# Patient Record
Sex: FEMALE | Race: WHITE | NOT HISPANIC OR LATINO | Employment: OTHER | URBAN - METROPOLITAN AREA
[De-identification: names, ages, dates, MRNs, and addresses within clinical notes are randomized per-mention and may not be internally consistent; named-entity substitution may affect disease eponyms.]

---

## 2017-05-19 RX ORDER — POTASSIUM CHLORIDE 1.5 G/1.58G
20 POWDER, FOR SOLUTION ORAL 2 TIMES DAILY
COMMUNITY
End: 2017-05-19

## 2017-05-19 RX ORDER — FUROSEMIDE 20 MG
40 TABLET ORAL EVERY MORNING
COMMUNITY

## 2017-05-19 RX ORDER — MULTIPLE VITAMINS W/ MINERALS TAB 9MG-400MCG
1 TAB ORAL EVERY MORNING
COMMUNITY

## 2017-05-19 RX ORDER — GEMFIBROZIL 600 MG/1
600 TABLET, FILM COATED ORAL
COMMUNITY

## 2017-05-19 RX ORDER — POLYETHYLENE GLYCOL 3350 17 G/17G
1 POWDER, FOR SOLUTION ORAL EVERY MORNING
COMMUNITY

## 2017-05-19 RX ORDER — FLUTICASONE PROPIONATE 50 MCG
1 SPRAY, SUSPENSION (ML) NASAL DAILY
COMMUNITY
End: 2020-03-04

## 2017-05-19 RX ORDER — OMEGA-3-ACID ETHYL ESTERS 1 G/1
CAPSULE, LIQUID FILLED ORAL
COMMUNITY
End: 2017-05-19

## 2017-05-19 RX ORDER — OMEGA-3 FATTY ACIDS/FISH OIL 300-1000MG
1 CAPSULE ORAL 2 TIMES DAILY
COMMUNITY

## 2017-05-19 RX ORDER — AZELASTINE 1 MG/ML
1 SPRAY, METERED NASAL DAILY PRN
COMMUNITY
End: 2021-06-11

## 2017-05-19 RX ORDER — OXYBUTYNIN CHLORIDE 5 MG/1
5 TABLET, EXTENDED RELEASE ORAL DAILY
COMMUNITY
End: 2017-05-19

## 2017-05-19 RX ORDER — SULFASALAZINE 500 MG/1
1000 TABLET ORAL 3 TIMES DAILY
COMMUNITY

## 2017-05-19 RX ORDER — LOVASTATIN 20 MG
40 TABLET ORAL AT BEDTIME
COMMUNITY

## 2017-05-22 ENCOUNTER — HOSPITAL ENCOUNTER (INPATIENT)
Facility: CLINIC | Age: 72
LOS: 1 days | Discharge: HOME | DRG: 470 | End: 2017-05-23
Attending: ORTHOPAEDIC SURGERY | Admitting: ORTHOPAEDIC SURGERY
Payer: MEDICARE

## 2017-05-22 ENCOUNTER — SURGERY (OUTPATIENT)
Age: 72
End: 2017-05-22

## 2017-05-22 ENCOUNTER — ANESTHESIA (OUTPATIENT)
Dept: SURGERY | Facility: CLINIC | Age: 72
DRG: 470 | End: 2017-05-22
Payer: MEDICARE

## 2017-05-22 ENCOUNTER — ANESTHESIA EVENT (OUTPATIENT)
Dept: SURGERY | Facility: CLINIC | Age: 72
DRG: 470 | End: 2017-05-22
Payer: MEDICARE

## 2017-05-22 ENCOUNTER — APPOINTMENT (OUTPATIENT)
Dept: GENERAL RADIOLOGY | Facility: CLINIC | Age: 72
DRG: 470 | End: 2017-05-22
Attending: ORTHOPAEDIC SURGERY
Payer: MEDICARE

## 2017-05-22 DIAGNOSIS — Z96.662 S/P ANKLE JOINT REPLACEMENT, LEFT: Primary | ICD-10-CM

## 2017-05-22 PROBLEM — Z96.669 S/P ANKLE JOINT REPLACEMENT: Status: ACTIVE | Noted: 2017-05-22

## 2017-05-22 LAB
CREAT SERPL-MCNC: 0.65 MG/DL (ref 0.52–1.04)
GFR SERPL CREATININE-BSD FRML MDRD: NORMAL ML/MIN/1.7M2
PLATELET # BLD AUTO: 217 10E9/L (ref 150–450)

## 2017-05-22 PROCEDURE — 0SPG04Z REMOVAL OF INTERNAL FIXATION DEVICE FROM LEFT ANKLE JOINT, OPEN APPROACH: ICD-10-PCS | Performed by: ORTHOPAEDIC SURGERY

## 2017-05-22 PROCEDURE — 36000063 ZZH SURGERY LEVEL 4 EA 15 ADDTL MIN: Performed by: ORTHOPAEDIC SURGERY

## 2017-05-22 PROCEDURE — 85049 AUTOMATED PLATELET COUNT: CPT | Performed by: PHYSICIAN ASSISTANT

## 2017-05-22 PROCEDURE — 25000566 ZZH SEVOFLURANE, EA 15 MIN: Performed by: ORTHOPAEDIC SURGERY

## 2017-05-22 PROCEDURE — 12000007 ZZH R&B INTERMEDIATE

## 2017-05-22 PROCEDURE — 82565 ASSAY OF CREATININE: CPT | Performed by: PHYSICIAN ASSISTANT

## 2017-05-22 PROCEDURE — 25000128 H RX IP 250 OP 636: Performed by: PHYSICIAN ASSISTANT

## 2017-05-22 PROCEDURE — 71000012 ZZH RECOVERY PHASE 1 LEVEL 1 FIRST HR: Performed by: ORTHOPAEDIC SURGERY

## 2017-05-22 PROCEDURE — 27110028 ZZH OR GENERAL SUPPLY NON-STERILE: Performed by: ORTHOPAEDIC SURGERY

## 2017-05-22 PROCEDURE — 36415 COLL VENOUS BLD VENIPUNCTURE: CPT | Performed by: PHYSICIAN ASSISTANT

## 2017-05-22 PROCEDURE — 71000013 ZZH RECOVERY PHASE 1 LEVEL 1 EA ADDTL HR: Performed by: ORTHOPAEDIC SURGERY

## 2017-05-22 PROCEDURE — 27210995 ZZH RX 272: Performed by: ORTHOPAEDIC SURGERY

## 2017-05-22 PROCEDURE — 25000128 H RX IP 250 OP 636: Performed by: ANESTHESIOLOGY

## 2017-05-22 PROCEDURE — 37000008 ZZH ANESTHESIA TECHNICAL FEE, 1ST 30 MIN: Performed by: ORTHOPAEDIC SURGERY

## 2017-05-22 PROCEDURE — 0SRG0JA REPLACEMENT OF LEFT ANKLE JOINT WITH SYNTHETIC SUBSTITUTE, UNCEMENTED, OPEN APPROACH: ICD-10-PCS | Performed by: ORTHOPAEDIC SURGERY

## 2017-05-22 PROCEDURE — 25000125 ZZHC RX 250: Performed by: NURSE ANESTHETIST, CERTIFIED REGISTERED

## 2017-05-22 PROCEDURE — 25000128 H RX IP 250 OP 636: Performed by: NURSE ANESTHETIST, CERTIFIED REGISTERED

## 2017-05-22 PROCEDURE — 27210794 ZZH OR GENERAL SUPPLY STERILE: Performed by: ORTHOPAEDIC SURGERY

## 2017-05-22 PROCEDURE — 0SPG08Z REMOVAL OF SPACER FROM LEFT ANKLE JOINT, OPEN APPROACH: ICD-10-PCS | Performed by: ORTHOPAEDIC SURGERY

## 2017-05-22 PROCEDURE — 25000125 ZZHC RX 250: Performed by: PHYSICIAN ASSISTANT

## 2017-05-22 PROCEDURE — 25000132 ZZH RX MED GY IP 250 OP 250 PS 637: Mod: GY | Performed by: PHYSICIAN ASSISTANT

## 2017-05-22 PROCEDURE — 0QBP0ZZ EXCISION OF LEFT METATARSAL, OPEN APPROACH: ICD-10-PCS | Performed by: ORTHOPAEDIC SURGERY

## 2017-05-22 PROCEDURE — A9270 NON-COVERED ITEM OR SERVICE: HCPCS | Mod: GY | Performed by: PHYSICIAN ASSISTANT

## 2017-05-22 PROCEDURE — 40000277 XR SURGERY CARM FLUORO LESS THAN 5 MIN W STILLS

## 2017-05-22 PROCEDURE — 40000170 ZZH STATISTIC PRE-PROCEDURE ASSESSMENT II: Performed by: ORTHOPAEDIC SURGERY

## 2017-05-22 PROCEDURE — 36000065 ZZH SURGERY LEVEL 4 W FLUORO 1ST 30 MIN: Performed by: ORTHOPAEDIC SURGERY

## 2017-05-22 PROCEDURE — 0L8P0ZZ DIVISION OF LEFT LOWER LEG TENDON, OPEN APPROACH: ICD-10-PCS | Performed by: ORTHOPAEDIC SURGERY

## 2017-05-22 PROCEDURE — S0077 INJECTION, CLINDAMYCIN PHOSP: HCPCS | Performed by: PHYSICIAN ASSISTANT

## 2017-05-22 PROCEDURE — C1776 JOINT DEVICE (IMPLANTABLE): HCPCS | Performed by: ORTHOPAEDIC SURGERY

## 2017-05-22 PROCEDURE — 37000009 ZZH ANESTHESIA TECHNICAL FEE, EACH ADDTL 15 MIN: Performed by: ORTHOPAEDIC SURGERY

## 2017-05-22 DEVICE — IMP INSERT TORNIER TIBIAL ANKLE SIZE 2X9MM LT LJU266: Type: IMPLANTABLE DEVICE | Site: ANKLE | Status: FUNCTIONAL

## 2017-05-22 DEVICE — IMP COMP TORNIER TALAR ANKLE SIZE 2 LT LJU212: Type: IMPLANTABLE DEVICE | Site: ANKLE | Status: FUNCTIONAL

## 2017-05-22 DEVICE — IMP INSERT TORNIER TIBIAL ANKLE BASE SIZE 2 LJU222: Type: IMPLANTABLE DEVICE | Site: ANKLE | Status: FUNCTIONAL

## 2017-05-22 RX ORDER — POLYETHYLENE GLYCOL 3350 17 G/17G
17 POWDER, FOR SOLUTION ORAL DAILY
Status: DISCONTINUED | OUTPATIENT
Start: 2017-05-22 | End: 2017-05-23 | Stop reason: HOSPADM

## 2017-05-22 RX ORDER — AZELASTINE 1 MG/ML
1 SPRAY, METERED NASAL DAILY PRN
Status: DISCONTINUED | OUTPATIENT
Start: 2017-05-22 | End: 2017-05-23 | Stop reason: HOSPADM

## 2017-05-22 RX ORDER — MULTIPLE VITAMINS W/ MINERALS TAB 9MG-400MCG
1 TAB ORAL EVERY MORNING
Status: DISCONTINUED | OUTPATIENT
Start: 2017-05-23 | End: 2017-05-23 | Stop reason: HOSPADM

## 2017-05-22 RX ORDER — FENTANYL CITRATE 50 UG/ML
25-50 INJECTION, SOLUTION INTRAMUSCULAR; INTRAVENOUS EVERY 5 MIN PRN
Status: DISCONTINUED | OUTPATIENT
Start: 2017-05-22 | End: 2017-05-22 | Stop reason: HOSPADM

## 2017-05-22 RX ORDER — ONDANSETRON 2 MG/ML
INJECTION INTRAMUSCULAR; INTRAVENOUS PRN
Status: DISCONTINUED | OUTPATIENT
Start: 2017-05-22 | End: 2017-05-22

## 2017-05-22 RX ORDER — CHLORAL HYDRATE 500 MG
1000 CAPSULE ORAL 2 TIMES DAILY
Status: DISCONTINUED | OUTPATIENT
Start: 2017-05-22 | End: 2017-05-23 | Stop reason: HOSPADM

## 2017-05-22 RX ORDER — SODIUM CHLORIDE, SODIUM LACTATE, POTASSIUM CHLORIDE, CALCIUM CHLORIDE 600; 310; 30; 20 MG/100ML; MG/100ML; MG/100ML; MG/100ML
INJECTION, SOLUTION INTRAVENOUS CONTINUOUS
Status: DISCONTINUED | OUTPATIENT
Start: 2017-05-22 | End: 2017-05-22

## 2017-05-22 RX ORDER — SODIUM CHLORIDE, SODIUM LACTATE, POTASSIUM CHLORIDE, CALCIUM CHLORIDE 600; 310; 30; 20 MG/100ML; MG/100ML; MG/100ML; MG/100ML
INJECTION, SOLUTION INTRAVENOUS CONTINUOUS
Status: DISCONTINUED | OUTPATIENT
Start: 2017-05-22 | End: 2017-05-23 | Stop reason: HOSPADM

## 2017-05-22 RX ORDER — OXYBUTYNIN CHLORIDE 5 MG/1
5 TABLET, EXTENDED RELEASE ORAL EVERY MORNING
Status: DISCONTINUED | OUTPATIENT
Start: 2017-05-23 | End: 2017-05-23 | Stop reason: HOSPADM

## 2017-05-22 RX ORDER — MAGNESIUM OXIDE 400 MG/1
400 TABLET ORAL 2 TIMES DAILY
Status: DISCONTINUED | OUTPATIENT
Start: 2017-05-22 | End: 2017-05-23 | Stop reason: HOSPADM

## 2017-05-22 RX ORDER — ONDANSETRON 2 MG/ML
4 INJECTION INTRAMUSCULAR; INTRAVENOUS EVERY 6 HOURS PRN
Status: DISCONTINUED | OUTPATIENT
Start: 2017-05-22 | End: 2017-05-23 | Stop reason: HOSPADM

## 2017-05-22 RX ORDER — FENTANYL CITRATE 50 UG/ML
INJECTION, SOLUTION INTRAMUSCULAR; INTRAVENOUS PRN
Status: DISCONTINUED | OUTPATIENT
Start: 2017-05-22 | End: 2017-05-22

## 2017-05-22 RX ORDER — CYCLOSPORINE 0.5 MG/ML
1 EMULSION OPHTHALMIC 2 TIMES DAILY
Status: DISCONTINUED | OUTPATIENT
Start: 2017-05-22 | End: 2017-05-23 | Stop reason: HOSPADM

## 2017-05-22 RX ORDER — NALOXONE HYDROCHLORIDE 0.4 MG/ML
.1-.4 INJECTION, SOLUTION INTRAMUSCULAR; INTRAVENOUS; SUBCUTANEOUS
Status: DISCONTINUED | OUTPATIENT
Start: 2017-05-22 | End: 2017-05-23 | Stop reason: HOSPADM

## 2017-05-22 RX ORDER — FOLIC ACID 1 MG/1
1 TABLET ORAL EVERY MORNING
Status: DISCONTINUED | OUTPATIENT
Start: 2017-05-23 | End: 2017-05-23 | Stop reason: HOSPADM

## 2017-05-22 RX ORDER — ACETAMINOPHEN 325 MG/1
650 TABLET ORAL EVERY 4 HOURS PRN
Status: DISCONTINUED | OUTPATIENT
Start: 2017-05-25 | End: 2017-05-23 | Stop reason: HOSPADM

## 2017-05-22 RX ORDER — ONDANSETRON 2 MG/ML
4 INJECTION INTRAMUSCULAR; INTRAVENOUS EVERY 30 MIN PRN
Status: DISCONTINUED | OUTPATIENT
Start: 2017-05-22 | End: 2017-05-22

## 2017-05-22 RX ORDER — CEFAZOLIN SODIUM 1 G/3ML
1 INJECTION, POWDER, FOR SOLUTION INTRAMUSCULAR; INTRAVENOUS SEE ADMIN INSTRUCTIONS
Status: DISCONTINUED | OUTPATIENT
Start: 2017-05-22 | End: 2017-05-22 | Stop reason: HOSPADM

## 2017-05-22 RX ORDER — ACETAMINOPHEN 325 MG/1
975 TABLET ORAL EVERY 8 HOURS
Status: DISCONTINUED | OUTPATIENT
Start: 2017-05-22 | End: 2017-05-23 | Stop reason: HOSPADM

## 2017-05-22 RX ORDER — ASPIRIN 81 MG/1
81 TABLET, CHEWABLE ORAL EVERY MORNING
Status: DISCONTINUED | OUTPATIENT
Start: 2017-05-22 | End: 2017-05-23 | Stop reason: HOSPADM

## 2017-05-22 RX ORDER — HYDROMORPHONE HYDROCHLORIDE 1 MG/ML
.3-.5 INJECTION, SOLUTION INTRAMUSCULAR; INTRAVENOUS; SUBCUTANEOUS EVERY 10 MIN PRN
Status: DISCONTINUED | OUTPATIENT
Start: 2017-05-22 | End: 2017-05-22

## 2017-05-22 RX ORDER — OXYCODONE HYDROCHLORIDE 5 MG/1
5-10 TABLET ORAL EVERY 4 HOURS PRN
Status: DISCONTINUED | OUTPATIENT
Start: 2017-05-22 | End: 2017-05-23 | Stop reason: HOSPADM

## 2017-05-22 RX ORDER — AMOXICILLIN 250 MG
1-2 CAPSULE ORAL 2 TIMES DAILY
Status: DISCONTINUED | OUTPATIENT
Start: 2017-05-22 | End: 2017-05-23 | Stop reason: HOSPADM

## 2017-05-22 RX ORDER — HYDROMORPHONE HYDROCHLORIDE 1 MG/ML
.3-.5 INJECTION, SOLUTION INTRAMUSCULAR; INTRAVENOUS; SUBCUTANEOUS
Status: DISCONTINUED | OUTPATIENT
Start: 2017-05-22 | End: 2017-05-23 | Stop reason: HOSPADM

## 2017-05-22 RX ORDER — MEPERIDINE HYDROCHLORIDE 25 MG/ML
12.5 INJECTION INTRAMUSCULAR; INTRAVENOUS; SUBCUTANEOUS
Status: DISCONTINUED | OUTPATIENT
Start: 2017-05-22 | End: 2017-05-22

## 2017-05-22 RX ORDER — PROPOFOL 10 MG/ML
INJECTION, EMULSION INTRAVENOUS CONTINUOUS PRN
Status: DISCONTINUED | OUTPATIENT
Start: 2017-05-22 | End: 2017-05-22

## 2017-05-22 RX ORDER — CEFAZOLIN SODIUM 2 G/100ML
2 INJECTION, SOLUTION INTRAVENOUS
Status: COMPLETED | OUTPATIENT
Start: 2017-05-22 | End: 2017-05-22

## 2017-05-22 RX ORDER — PHYSOSTIGMINE SALICYLATE 1 MG/ML
1.2 INJECTION INTRAVENOUS
Status: DISCONTINUED | OUTPATIENT
Start: 2017-05-22 | End: 2017-05-22

## 2017-05-22 RX ORDER — HYDROXYZINE HYDROCHLORIDE 10 MG/1
10 TABLET, FILM COATED ORAL EVERY 6 HOURS PRN
Status: DISCONTINUED | OUTPATIENT
Start: 2017-05-22 | End: 2017-05-23 | Stop reason: HOSPADM

## 2017-05-22 RX ORDER — PROPOFOL 10 MG/ML
INJECTION, EMULSION INTRAVENOUS PRN
Status: DISCONTINUED | OUTPATIENT
Start: 2017-05-22 | End: 2017-05-22

## 2017-05-22 RX ORDER — NALOXONE HYDROCHLORIDE 0.4 MG/ML
.1-.4 INJECTION, SOLUTION INTRAMUSCULAR; INTRAVENOUS; SUBCUTANEOUS
Status: DISCONTINUED | OUTPATIENT
Start: 2017-05-22 | End: 2017-05-22

## 2017-05-22 RX ORDER — ONDANSETRON 4 MG/1
4 TABLET, ORALLY DISINTEGRATING ORAL EVERY 6 HOURS PRN
Status: DISCONTINUED | OUTPATIENT
Start: 2017-05-22 | End: 2017-05-23 | Stop reason: HOSPADM

## 2017-05-22 RX ORDER — FENTANYL CITRATE 50 UG/ML
25-50 INJECTION, SOLUTION INTRAMUSCULAR; INTRAVENOUS
Status: DISCONTINUED | OUTPATIENT
Start: 2017-05-22 | End: 2017-05-22 | Stop reason: HOSPADM

## 2017-05-22 RX ORDER — FUROSEMIDE 20 MG
20 TABLET ORAL EVERY MORNING
Status: DISCONTINUED | OUTPATIENT
Start: 2017-05-23 | End: 2017-05-23 | Stop reason: HOSPADM

## 2017-05-22 RX ORDER — CYCLOSPORINE 0.5 MG/ML
1 EMULSION OPHTHALMIC 2 TIMES DAILY
COMMUNITY

## 2017-05-22 RX ORDER — ONDANSETRON 4 MG/1
4 TABLET, ORALLY DISINTEGRATING ORAL EVERY 30 MIN PRN
Status: DISCONTINUED | OUTPATIENT
Start: 2017-05-22 | End: 2017-05-22

## 2017-05-22 RX ORDER — EPHEDRINE SULFATE 50 MG/ML
INJECTION, SOLUTION INTRAMUSCULAR; INTRAVENOUS; SUBCUTANEOUS PRN
Status: DISCONTINUED | OUTPATIENT
Start: 2017-05-22 | End: 2017-05-22

## 2017-05-22 RX ORDER — CLINDAMYCIN PHOSPHATE 900 MG/50ML
900 INJECTION, SOLUTION INTRAVENOUS EVERY 8 HOURS
Status: COMPLETED | OUTPATIENT
Start: 2017-05-22 | End: 2017-05-23

## 2017-05-22 RX ORDER — FLUTICASONE PROPIONATE 50 MCG
1 SPRAY, SUSPENSION (ML) NASAL DAILY
Status: DISCONTINUED | OUTPATIENT
Start: 2017-05-23 | End: 2017-05-23 | Stop reason: HOSPADM

## 2017-05-22 RX ORDER — SODIUM CHLORIDE, SODIUM LACTATE, POTASSIUM CHLORIDE, CALCIUM CHLORIDE 600; 310; 30; 20 MG/100ML; MG/100ML; MG/100ML; MG/100ML
INJECTION, SOLUTION INTRAVENOUS CONTINUOUS PRN
Status: DISCONTINUED | OUTPATIENT
Start: 2017-05-22 | End: 2017-05-22

## 2017-05-22 RX ORDER — LIDOCAINE HYDROCHLORIDE 20 MG/ML
INJECTION, SOLUTION INFILTRATION; PERINEURAL PRN
Status: DISCONTINUED | OUTPATIENT
Start: 2017-05-22 | End: 2017-05-22

## 2017-05-22 RX ORDER — MAGNESIUM HYDROXIDE 1200 MG/15ML
LIQUID ORAL PRN
Status: DISCONTINUED | OUTPATIENT
Start: 2017-05-22 | End: 2017-05-22

## 2017-05-22 RX ORDER — LIDOCAINE 40 MG/G
CREAM TOPICAL
Status: DISCONTINUED | OUTPATIENT
Start: 2017-05-22 | End: 2017-05-23 | Stop reason: HOSPADM

## 2017-05-22 RX ORDER — PSEUDOEPHEDRINE HCL 60 MG
60 TABLET ORAL 2 TIMES DAILY PRN
Status: DISCONTINUED | OUTPATIENT
Start: 2017-05-22 | End: 2017-05-23 | Stop reason: HOSPADM

## 2017-05-22 RX ORDER — DEXAMETHASONE SODIUM PHOSPHATE 4 MG/ML
INJECTION, SOLUTION INTRA-ARTICULAR; INTRALESIONAL; INTRAMUSCULAR; INTRAVENOUS; SOFT TISSUE PRN
Status: DISCONTINUED | OUTPATIENT
Start: 2017-05-22 | End: 2017-05-22

## 2017-05-22 RX ORDER — ATENOLOL 50 MG/1
50 TABLET ORAL EVERY MORNING
Status: DISCONTINUED | OUTPATIENT
Start: 2017-05-23 | End: 2017-05-23 | Stop reason: HOSPADM

## 2017-05-22 RX ORDER — POTASSIUM CHLORIDE 1500 MG/1
20 TABLET, EXTENDED RELEASE ORAL 2 TIMES DAILY
Status: DISCONTINUED | OUTPATIENT
Start: 2017-05-22 | End: 2017-05-23 | Stop reason: HOSPADM

## 2017-05-22 RX ORDER — FOLIC ACID 1 MG/1
1 TABLET ORAL EVERY MORNING
COMMUNITY

## 2017-05-22 RX ORDER — GEMFIBROZIL 600 MG/1
600 TABLET, FILM COATED ORAL
Status: DISCONTINUED | OUTPATIENT
Start: 2017-05-22 | End: 2017-05-23 | Stop reason: HOSPADM

## 2017-05-22 RX ADMIN — ACETAMINOPHEN 975 MG: 325 TABLET, FILM COATED ORAL at 15:22

## 2017-05-22 RX ADMIN — GEMFIBROZIL 600 MG: 600 TABLET ORAL at 16:20

## 2017-05-22 RX ADMIN — POLYETHYLENE GLYCOL 3350 17 G: 17 POWDER, FOR SOLUTION ORAL at 18:23

## 2017-05-22 RX ADMIN — PROPOFOL 200 MG: 10 INJECTION, EMULSION INTRAVENOUS at 08:41

## 2017-05-22 RX ADMIN — LIDOCAINE HYDROCHLORIDE 60 MG: 20 INJECTION, SOLUTION INFILTRATION; PERINEURAL at 08:41

## 2017-05-22 RX ADMIN — SODIUM CHLORIDE, POTASSIUM CHLORIDE, SODIUM LACTATE AND CALCIUM CHLORIDE: 600; 310; 30; 20 INJECTION, SOLUTION INTRAVENOUS at 11:54

## 2017-05-22 RX ADMIN — ONDANSETRON 4 MG: 2 INJECTION INTRAMUSCULAR; INTRAVENOUS at 08:54

## 2017-05-22 RX ADMIN — SENNOSIDES AND DOCUSATE SODIUM 1 TABLET: 8.6; 5 TABLET ORAL at 20:39

## 2017-05-22 RX ADMIN — PHENYLEPHRINE HYDROCHLORIDE 100 MCG: 10 INJECTION, SOLUTION INTRAMUSCULAR; INTRAVENOUS; SUBCUTANEOUS at 09:24

## 2017-05-22 RX ADMIN — FENTANYL CITRATE 50 MCG: 50 INJECTION, SOLUTION INTRAMUSCULAR; INTRAVENOUS at 08:45

## 2017-05-22 RX ADMIN — PHENYLEPHRINE HYDROCHLORIDE 100 MCG: 10 INJECTION, SOLUTION INTRAMUSCULAR; INTRAVENOUS; SUBCUTANEOUS at 09:18

## 2017-05-22 RX ADMIN — FENTANYL CITRATE 25 MCG: 50 INJECTION, SOLUTION INTRAMUSCULAR; INTRAVENOUS at 09:17

## 2017-05-22 RX ADMIN — ACETAMINOPHEN 975 MG: 325 TABLET, FILM COATED ORAL at 21:26

## 2017-05-22 RX ADMIN — Medication 7.5 MG: at 09:36

## 2017-05-22 RX ADMIN — OYSTER SHELL CALCIUM WITH VITAMIN D 1 TABLET: 500; 200 TABLET, FILM COATED ORAL at 18:23

## 2017-05-22 RX ADMIN — PHENYLEPHRINE HYDROCHLORIDE 100 MCG: 10 INJECTION, SOLUTION INTRAMUSCULAR; INTRAVENOUS; SUBCUTANEOUS at 09:13

## 2017-05-22 RX ADMIN — ROPIVACAINE HYDROCHLORIDE 50 ML: 5 INJECTION, SOLUTION EPIDURAL; INFILTRATION; PERINEURAL at 08:15

## 2017-05-22 RX ADMIN — MIDAZOLAM HYDROCHLORIDE 2 MG: 1 INJECTION, SOLUTION INTRAMUSCULAR; INTRAVENOUS at 08:41

## 2017-05-22 RX ADMIN — SODIUM CHLORIDE, POTASSIUM CHLORIDE, SODIUM LACTATE AND CALCIUM CHLORIDE: 600; 310; 30; 20 INJECTION, SOLUTION INTRAVENOUS at 08:31

## 2017-05-22 RX ADMIN — Medication 1000 MG: at 20:37

## 2017-05-22 RX ADMIN — SODIUM CHLORIDE 300 ML: 0.9 IRRIGANT IRRIGATION at 09:34

## 2017-05-22 RX ADMIN — PHENYLEPHRINE HYDROCHLORIDE 50 MCG: 10 INJECTION, SOLUTION INTRAMUSCULAR; INTRAVENOUS; SUBCUTANEOUS at 09:39

## 2017-05-22 RX ADMIN — Medication 5 MG: at 09:46

## 2017-05-22 RX ADMIN — POTASSIUM CHLORIDE 20 MEQ: 1500 TABLET, EXTENDED RELEASE ORAL at 20:39

## 2017-05-22 RX ADMIN — DEXMEDETOMIDINE HYDROCHLORIDE 4 MCG: 100 INJECTION, SOLUTION INTRAVENOUS at 09:02

## 2017-05-22 RX ADMIN — SODIUM CHLORIDE, POTASSIUM CHLORIDE, SODIUM LACTATE AND CALCIUM CHLORIDE: 600; 310; 30; 20 INJECTION, SOLUTION INTRAVENOUS at 11:53

## 2017-05-22 RX ADMIN — ASPIRIN 81 MG 81 MG: 81 TABLET ORAL at 18:23

## 2017-05-22 RX ADMIN — CEFAZOLIN SODIUM 2 G: 2 INJECTION, SOLUTION INTRAVENOUS at 08:44

## 2017-05-22 RX ADMIN — FENTANYL CITRATE 50 MCG: 50 INJECTION, SOLUTION INTRAMUSCULAR; INTRAVENOUS at 08:41

## 2017-05-22 RX ADMIN — CYCLOSPORINE 1 DROP: 0.5 EMULSION OPHTHALMIC at 20:37

## 2017-05-22 RX ADMIN — MAGNESIUM OXIDE TAB 400 MG (241.3 MG ELEMENTAL MG) 400 MG: 400 (241.3 MG) TAB at 20:39

## 2017-05-22 RX ADMIN — PROPOFOL 200 MCG/KG/MIN: 10 INJECTION, EMULSION INTRAVENOUS at 08:41

## 2017-05-22 RX ADMIN — DEXAMETHASONE SODIUM PHOSPHATE 4 MG: 4 INJECTION, SOLUTION INTRA-ARTICULAR; INTRALESIONAL; INTRAMUSCULAR; INTRAVENOUS; SOFT TISSUE at 08:54

## 2017-05-22 RX ADMIN — DEXMEDETOMIDINE HYDROCHLORIDE 8 MCG: 100 INJECTION, SOLUTION INTRAVENOUS at 08:49

## 2017-05-22 RX ADMIN — Medication 5 MG: at 09:39

## 2017-05-22 RX ADMIN — CLINDAMYCIN PHOSPHATE 900 MG: 18 INJECTION, SOLUTION INTRAVENOUS at 16:20

## 2017-05-22 NOTE — ANESTHESIA POSTPROCEDURE EVALUATION
Patient: Jany Scherer    Procedure(s):  LEFT TOTAL ANKLE ARTHROPLASTY (FRANKLIN)^ WITH ACHILLES LENGTHENING AND HARDWARE REMOVAL ( C-ARM) - Wound Class: I-Clean   - Wound Class: I-Clean      Diagnosis:DJD LEFT ANKLE  Diagnosis Additional Information: No value filed.    Anesthesia Type:  General, Periph. Nerve Block for postop pain    Note:  Anesthesia Post Evaluation    Patient location during evaluation: PACU  Patient participation: Able to fully participate in evaluation  Level of consciousness: awake  Pain management: adequate  Airway patency: patent  Cardiovascular status: acceptable  Respiratory status: acceptable  Hydration status: acceptable  PONV: controlled     Anesthetic complications: None          Last vitals:  Vitals:    05/22/17 0820 05/22/17 1000 05/22/17 1015   BP: 138/81 142/71 130/70   Pulse: 84     Resp: 16 20 16   Temp:  36.3  C (97.4  F) 35.4  C (95.7  F)   SpO2: 97% 98% 100%         Electronically Signed By: Stu Mendoza MD  May 22, 2017  10:28 AM

## 2017-05-22 NOTE — IP AVS SNAPSHOT
[address] [facility]    [address] [place] [address]    Phone:  [phone]                                       After Visit Summary   5/22/2017    Jany Scehrer    MRN: [id number]           After Visit Summary Signature Page     I have received my discharge instructions, and my questions have been answered. I have discussed any challenges I see with this plan with the nurse or doctor.    ..........................................................................................................................................  Patient/Patient Representative Signature      ..........................................................................................................................................  Patient Representative Print Name and Relationship to Patient    ..................................................               ................................................  Date                                            Time    ..........................................................................................................................................  Reviewed by Signature/Title    ...................................................              ..............................................  Date                                                            Time

## 2017-05-22 NOTE — OR NURSING
Care assumed post nerve block placement.  See anesthesia record for procedure documentation. Pt awaiting surgery.

## 2017-05-22 NOTE — PROGRESS NOTES
Admission medication history interview status for the 5/22/2017  admission is complete. See EPIC admission navigator for prior to admission medications     Medication history source reliability:Good    Medication history interview source(s):Patient    Medication history resources (including written lists, pill bottles, clinic record):None    Primary pharmacy.[facility], [place]    Additional medication history information not noted on PTA med list :None    Time spent in this activity: 30 minutes    Prior to Admission medications    Medication Sig Last Dose Taking? Auth Provider   Cholecalciferol (VITAMIN D PO) Take 1 capsule by mouth every morning 5/22/2017 at 0300 Yes Reported, Patient   CRANBERRY PO Take 1 capsule by mouth At Bedtime 5/21/2017 at 2130 Yes Reported, Patient   CycloSPORINE (RESTASIS OP) Apply 1 drop to eye 2 times daily 5/22/2017 at 0300 Yes Reported, Patient   FOLIC ACID PO Take 1 mg by mouth every morning 5/22/2017 at 0300 Yes Reported, Patient   Acetaminophen (TYLENOL PO) Take 650 mg by mouth 2 times daily as needed for mild pain  5/21/2017 at 2130 Yes Reported, Patient   ASPIRIN PO Take 81 mg by mouth every morning  5/14/2017 at am Yes Reported, Patient   azelastine (ASTELIN) 0.1 % spray Spray 1 spray into both nostrils daily as needed  Over 1 week ago at prn Yes Reported, Patient   fluticasone (FLONASE) 50 MCG/ACT spray Spray 1 spray into both nostrils daily  5/22/2017 at 0300 Yes Reported, Patient   FUROSEMIDE PO Take 20 mg by mouth every morning  5/22/2017 at 0300 Yes Reported, Patient   GEMFIBROZIL PO Take 600 mg by mouth 2 times daily (before meals) 5/22/2017 at 0300 Yes Reported, Patient   LOVASTATIN PO Take 40 mg by mouth every morning (patient takes 2 X 20 mg = 40 mg dose) 5/22/2017 at 0300 Yes Reported, Patient   MAGNESIUM OXIDE PO Take 400 mg by mouth 2 times daily  5/22/2017 at 0300 Yes Reported, Patient   polyethylene glycol (MIRALAX/GLYCOLAX) powder Take 1 capful by mouth  daily 5/21/2017 at 0600 Yes Reported, Patient   multivitamin, therapeutic with minerals (MULTI-VITAMIN) TABS tablet Take 1 tablet by mouth every morning  5/22/2017 at 0300 Yes Reported, Patient   CALCIUM-VITAMIN D PO Take 1 tablet by mouth 2 times daily  5/22/2017 at 0300 Yes Reported, Patient   Pseudoephedrine HCl (SUDAFED PO) Take 60 mg by mouth 2 times daily as needed  Over 1 month ago at prn Yes Reported, Patient   SULFASALAZINE PO Take 2,000 mg by mouth 2 times daily (patient takes 4 X 500 = 2,000 mg dose) 5/22/2017 at 0300 Yes Reported, Patient   ATENOLOL PO Take 50 mg by mouth every morning  5/22/2017 at 0300 Yes Reported, Patient   Potassium Chloride Raissa CR (K-DUR PO) Take 20 mEq by mouth 2 times daily 5/22/2017 at 0300 Yes Reported, Patient   Omega-3 Fatty Acids (OMEGA 3 PO) Take 1 capsule by mouth 2 times daily  5/22/2017 at 0300 Yes Reported, Patient   OXYBUTYNIN CHLORIDE ER PO Take 5 mg by mouth every morning  5/22/2017 at 0300 Yes Reported, Patient

## 2017-05-22 NOTE — ANESTHESIA CARE TRANSFER NOTE
Patient: Jany Scherer    Procedure(s):  LEFT TOTAL ANKLE ARTHROPLASTY (FR[facility]LIN)^ WITH ACHILLES LENGTHENING AND HARDWARE REMOVAL ( C-ARM) - Wound Class: I-Clean   - Wound Class: I-Clean      Diagnosis: DJD LEFT ANKLE  Diagnosis Additional Information: No value filed.    Anesthesia Type:   General, Periph. Nerve Block for postop pain     Note:  Airway :Face Mask  Patient transferred to:PACU  Comments: VSS, RR and rhythm regular, TV's adequate, LMA removed to facemask 10LO2. Transferred to PACU, spontaneous respirations, 10L oxygen via facemask.  All monitors and alarms on and functioning, VSS.  Patient awake, comfortable.  Report to PACU RN.      Vitals: (Last set prior to Anesthesia Care Transfer)    CRNA VITALS  5/22/2017 0928 - 5/22/2017 1006      5/22/2017             Resp Rate (set): 10                Electronically Signed By: BULL Jones CRNA  May 22, 2017  10:06 AM

## 2017-05-22 NOTE — PLAN OF CARE
Problem: Goal Outcome Summary  Goal: Goal Outcome Summary  Outcome: No Change  Pt is DOS arrived around 1145, A&Ox3, IV infusing and VSS on 2L O2. Pt tolerating clear liquids so has been advanced to regular diet. Pt is DTV. Colostomy is intact and pt had PTA. Pt dressing is C/D/I and pulses +2, pt states the LLE is very numb still. Pt denies pain. Pt wears a CPAP at night. Pt had general anesthesia with popliteal and saphenous blocks and EBL of 50cc. Pt is progressing toward plan of care.

## 2017-05-22 NOTE — OP NOTE
DATE OF SERVICE:  05/22/2017.      PREOPERATIVE DIAGNOSES:   1.  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   2.  Retained hardware in the fibula and medial malleolus.   3.  Severe talonavicular degenerative changes.      POSTOPERATIVE DIAGNOSES:   1.  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   2.  Retained hardware in the fibula and medial malleolus.   3.  Severe talonavicular degenerative changes.      SURGICAL PROCEDURES:   1.  Takedown of a left ankle fusion with a conversion to a total ankle replacement using a Fitz Talaris size 2 tibia and talus and a 9 mm polyethylene spacer.   2.  Open cheilectomy and debridement of the left talonavicular joint.   3.  Removal of Arriola pamela, left fibula.   4.  A percutaneous left Achilles tendon lengthening.      ANESTHETIC:  General.      SURGEON:  Cuca Bee MD       ASSISTANT:  DANIAL Ferguson       PREAMBLE:  Ms. Jany Scherer had a bad ankle fracture many years ago.  Over time, she developed severe degenerative changes of her ankle and an autofusion of the joint.  She has significant talonavicular arthritis with significant pain.      The Arriola pamela in the fibula is palpable.      Informed consent was obtained for above-mentioned procedure.      Two grams of Ancef was given prior to surgery.  There is no need for postoperative antibiotic prophylaxis.      An assistant was present and was needed to help with this very complex procedure to help with retraction of the wounds and positioning of the cutting blocks.      DESCRIPTION OF PROCEDURE:  After adequate induction of a general anesthetic, the patient was positioned supine on the operating table.  The left leg was sterilely prepped and free draped in the usual fashion.  Tourniquet around the thigh was inflated to 300 mmHg.      A standard 15 cm midline incision was made anterior over the ankle.  The interval between extensor hallucis longus and  tibialis anterior was used to expose the joint.  There was a complete fusion of the tibiotalar joint.      Intraoperative fluoroscopy was used to determine the level of the ankle joint on the lateral view.  A block of bone was then removed using the tibial cutting block.  A complete medial and lateral gutter excision was also done to allow motion of the foot on the tibia.      The talar cutting blocks were then used to shape the talus for a primary ankle replacement.  These were extremely complicated cuts because of the previous fusion.      A trial reduction was then done using a size 2 talus and tibia with a 9 mm polyethylene spacer.  There was an equinus contracture.      A percutaneous Achilles tendon lengthening was done using a Mobile technique.  This increased dorsiflexion to 10 degrees.      The trial components were removed.  The ankle was thoroughly rinsed and the gutters were again debrided.  The definitive size 2 components were then inserted with a 9 mm polyethylene spacer with excellent stability and range of motion.      Osteotome was used to do a generous cheilectomy of the large osteophytes around the talonavicular joint.  There were significant degenerative changes of the joint, but it was not fused at this point.      This was followed by an incision over the fibula.  The Arriola pamela was exposed.  The extraction device was used to extract the Rush pamela.      The tourniquet was deflated.  Hemostasis obtained.  The wounds closed in layers.  A sterile dressing and a light compressive bandage applied, followed by a short-leg cast.      She tolerated the procedure well and was taken to the recovery room in satisfactory condition.      PLAN:  She can ambulate toe-touch weightbearing with crutches.  Sutures will be removed in 2 weeks.         ANITA MARROQUIN MD             D: 05/22/2017 09:42   T: 05/22/2017 10:28   MT: TS      Name:     SWETHA CARO   MRN:      [id number]        Account:         [id number]   :      1945           Procedure Date: 2017      Document: [id number]       cc: Cuca Bee MD

## 2017-05-22 NOTE — ANESTHESIA PROCEDURE NOTES
Peripheral nerve/Neuraxial procedure note : Popliteal  Pre-Procedure  Performed by SANDY GERMAN  Location:   Procedure Times:5/22/2017 8:20 AM    .   Procedure Documentation    .    Procedure:    Popliteal.     .  .       Assessment/Narrative  .  .  . Comments:  Sciatic-popliteal and saphenous nerve blocks for post-op analgesia  Betadine prep: 22guage 2inch stimulusNeedle: 40cc 0.5% Ropivicaine with 1/200/000 epinipherine for sciatic-popliteal block: 10cc same solution for Saphenous nerve block          0815  Left popliteal and saphenous blocks for pain relief at surgeon's request.  With the patient in the prone position, after IV started,  pulse oximeter in place: 22 GA Stimuplex and 25 GA; 50cc (40 + 10) 1/2 % Ropivicaine; 1;200,000 Epi

## 2017-05-22 NOTE — IP AVS SNAPSHOT
MRN:[id number]                      After Visit Summary   5/22/2017    Jany Scherer    MRN: [id number]           Thank you!     Thank you for choosing [facility] for your care. Our goal is always to provide you with excellent care. Hearing back from our patients is one way we can continue to improve our services. Please take a few minutes to complete the written survey that you may receive in the mail after you visit with us. Thank you!        Patient Information     Date Of Birth          1945        Designated Caregiver       Most Recent Value    Caregiver    Will someone help with your care after discharge? yes    Name of designated caregiver Iam-    Phone number of caregiver [phone]    Caregiver address [address] [address], [place], [place]      About your hospital stay     You were admitted on:  May 22, 2017 You last received care in the:  [place] [address] [facility]    You were discharged on:  May 23, 2017        Reason for your hospital stay       L TAA                  Who to Call     For medical emergencies, please call 911.  For non-urgent questions about your medical care, please call your primary care provider or clinic, [phone]  For questions related to your surgery, please call your surgery clinic        Attending Provider     Provider Specialty    Cuca Bee MD Internal Medicine       Primary Care Provider Office Phone # Fax #    Collette Castro [phone]       [place] [address] [address] [facility] [address]         When to contact your care team       Call Dr. Bee if you have any of the following: temperature greater than 102 or less than 96,  increased shortness of breath, increased drainage, increased swelling or increased pain.                  After Care Instructions     Activity       Your activity upon discharge: ambulate in house.  50% flatfoot weightbearing for tranfers, do not push off                   Follow-up Appointments     Follow-up and recommended labs and tests        Follow up with Dr. Bee , at (location with clinic name or city) [facility], within 2 weeks  to evaluate after surgery. No follow up labs or test are needed.                  Further instructions from your care team       Discharge Instructions Following Ankle Surgery    Activity Level:  1. Physical activity may be resumed gradually according to your comfort level.  2. Use you walker/crutches and limit the amount of weight you place on your foot, as instructed by your surgeon. Your surgeon will determine at your follow up visit when weight bearing restrictions will change or no longer apply.   3. It is recommended that you elevate your foot/ankle when lying or sitting to reduce swelling and pain.  4. It is also recommended that you apply ice to your foot/toes to help reduce swelling and discomfort.     Good Health Practices:  1. Maintain an adequate fluid intake and eat a well balanced diet to promote healing.  2. Surgery, decreased activity and pain medication all contribute to a decrease in bowel activity that can result in constipation. It is recommended that you increase your liquid intake, add fiber to your diet and add an over the counter laxative to your daily schedule if you experience these problems.  3. Keep your splint or cast clean and dry. WE recommend securing a plastic bag over your lower leg for showering to keep it dry.  4. Do not stick items between your cast and leg as this can cause skin or incision damage.    Follow Up:  1. If you have had a surgery performed by Dr. Bee, you should call to make a follow up appointment for 2 weeks from the date of surgery.  2. If your surgery was performed by one of our other doctors, please call to make your follow up appointment for 7-10 days from the date of surgery. Please call [phone] to schedule your appointment.       Total Ankle Arthroplasty and Ankle Fusion    J.  Jamshid Bee M.D.    This protocol provides you with general guidelines for initial stage and progression of rehabilitation according to specified time frames, related tissue tolerance and directional preference of movement. Specific changes in the program will be made by the physician as appropriate for the individual patient.     REMEMBER: It can take up to a year to make a full recovery, and it is not unusual to have intermittent pains and aches during that time!     Phase I: Date of Surgery - 6 weeks   Objective: Healing, protection of joint replacement and fusions   Immobilization: cast, splint; After 2 week follow-up visit: removable boot   WB Status: partial weight bearing       Phase II: Week 6-8   Objective: Healing, protection of joint replacement and fusions   Immobilization: use of removable walker boot as needed   WB Status: weight bearing   Therapy: may be initiated towards the end of this phase, 1-2 x per week with a focus on swelling reduction, pain control, and early return of AROM, home care/exercise instructions for motion, pain/swelling control       Phase III: Week 8-16   Objective: Swelling reduction, increase in ROM, neuromuscular re-education, develop baseline of ankle control/strength   Immobilization: None   WB Status: WBAT, progressive reduction in crutch use, *NOTE - WB status and gait progression determined by physician based on radiographic evidence of implant incorporation and consolidation of distal tibiofibular fusion   Therapy: 1-2 x per week based on patient s initial presentation, frequency may be reduced as the patient exhibits good recovery and progress towards goals, instructions in home care and exercise to complement clinical care.     Rehab Program:    ROM - AROM, PROM, patient directed stretching, joint mobilization, *NOTE - joint mobilization should focus on techniques for general talocrural distraction and facilitating dorsiflexion and plantarflexion. Techniques for  inversion and eversion should be minimized and may be contraindicated if the patient has had ancillary procedures such as subtalar fusion or triple arthrodesis. The distal tibiofibular syndesmosis should not be mobilized. Soft tissue techniques may be used for swelling reduction and scar tissue mobilization. Goals for ROM are ? 10  of dorsiflexion and ? 40  of plantarflexion      Strength - techniques should begin with isometrics in four directions with progression to resistive band/isotonic strengthening for dorsiflexion and plantarflexion. Due to joint fusions, eversion and inversion strengthening should continue isometrically, bands should progress to heavy resistance as tolerated, swimming and biking allowed as tolerated      Proprioception - may begin with seated BAPS board and progress to standing balance assisted exercises as tolerated       PHASE IV: Week 16-24   Objective: functional ROM, good strength, adequate proprioception for stable balance, normalize gait, tolerate full day of ADLs/work, return to reasonable recreational activities   WB status: full, patient should exhibit normalized gait   Therapy: 1x every 2-4 weeks based on patient status and progression, to be discharged to an independent exercise program once goals are achieved, patient to be instructed in appropriate home exercise program     Rehab Program:      ROM - patient to achieve ? 10  of dorsiflexion and ? 40  of plantarflexion         Strength - progression to body weight resistance exercises with goal of ability to perform a single leg heel raise         Proprioception - patient should be instructed in proprioceptive drills that provide both visual and surface challenges to balance         Agility - cone/stick drills, leg press plyometrics, soft landing drills         Sports - prior to return to any running or jumping activity the patient must display a normalized gait and have strength to perform repetitive single leg     Any further  "questions should be directed to your physician. Please call to schedule your follow-up appointment ([phone])    Follow -up appointment:__________________________________________________  Nurse Signature:_________________________________Date:________Time:______  Patient Signature:__________________________________Date:_____________      Pending Results     No orders found for last 3 day(s).            Statement of Approval     Ordered          17 1505  I have reviewed and agree with all the recommendations and orders detailed in this document.  EFFECTIVE NOW     Approved and electronically signed by:  Augusto Dickson, DANYA             Admission Information     Date & Time Provider Department Dept. Phone    2017 Cuca Bee MD [place] [address] [facility] [phone]      Your Vitals Were     Blood Pressure Pulse Temperature Respirations Height Weight    121/67 70 98.5  F (36.9  C) (Oral) 16 1.651 m (5' 5\") 63.1 kg (139 lb 1.6 oz)    Pulse Oximetry BMI (Body Mass Index)                97% 23.15 kg/m2          MyChart Information     [employer] lets you send messages to your doctor, view your test results, renew your prescriptions, schedule appointments and more. To sign up, go to [email or web address] . Click on \"Log in\" on the left side of the screen, which will take you to the Welcome page. Then click on \"Sign up Now\" on the right side of the page.     You will be asked to enter the access code listed below, as well as some personal information. Please follow the directions to create your username and password.     Your access code is: [email or web address]  Expires: 2017  2:44 PM     Your access code will  in 90 days. If you need help or a new code, please call your [facility] clinic or [phone].        Care EveryWhere ID     This is your Care EveryWhere ID. This could be used by other organizations to access your [facility] medical records  [id number]           Review " of your medicines      START taking        Dose / Directions    enoxaparin 40 MG/0.4ML injection   Commonly known as:  LOVENOX        Dose:  40 mg   Inject 0.4 mLs (40 mg) Subcutaneous every 24 hours   Quantity:  5 Syringe   Refills:  0       order for DME        Equipment being ordered: Walker Wheels () and Walker () Treatment Diagnosis: Difficulty walking   Quantity:  1 each   Refills:  0       oxyCODONE-acetaminophen 5-325 MG per tablet   Commonly known as:  PERCOCET        Dose:  1-2 tablet   Take 1-2 tablets by mouth every 4 hours as needed   Quantity:  60 tablet   Refills:  0       senna-docusate 8.6-50 MG per tablet   Commonly known as:  SENOKOT-S;PERICOLACE        Dose:  1-2 tablet   Take 1-2 tablets by mouth 2 times daily   Quantity:  40 tablet   Refills:  1         CONTINUE these medicines which have NOT CHANGED        Dose / Directions    ASPIRIN PO        Dose:  81 mg   Take 81 mg by mouth every morning   Refills:  0       ATENOLOL PO        Dose:  50 mg   Take 50 mg by mouth every morning   Refills:  0       azelastine 0.1 % spray   Commonly known as:  ASTELIN        Dose:  1 spray   Spray 1 spray into both nostrils daily as needed   Refills:  0       CALCIUM-VITAMIN D PO        Dose:  1 tablet   Take 1 tablet by mouth 2 times daily   Refills:  0       CRANBERRY PO        Dose:  1 capsule   Take 1 capsule by mouth At Bedtime   Refills:  0       fluticasone 50 MCG/ACT spray   Commonly known as:  FLONASE        Dose:  1 spray   Spray 1 spray into both nostrils daily   Refills:  0       FOLIC ACID PO        Dose:  1 mg   Take 1 mg by mouth every morning   Refills:  0       FUROSEMIDE PO        Dose:  20 mg   Take 20 mg by mouth every morning   Refills:  0       GEMFIBROZIL PO        Dose:  600 mg   Take 600 mg by mouth 2 times daily (before meals)   Refills:  0       K-DUR PO        Dose:  20 mEq   Take 20 mEq by mouth 2 times daily   Refills:  0       LOVASTATIN PO        Dose:  40 mg   Take 40  mg by mouth every morning (patient takes 2 X 20 mg = 40 mg dose)   Refills:  0       MAGNESIUM OXIDE PO        Dose:  400 mg   Take 400 mg by mouth 2 times daily   Refills:  0       Multi-vitamin Tabs tablet        Dose:  1 tablet   Take 1 tablet by mouth every morning   Refills:  0       OMEGA 3 PO        Dose:  1 capsule   Take 1 capsule by mouth 2 times daily   Refills:  0       OXYBUTYNIN CHLORIDE ER PO        Dose:  5 mg   Take 5 mg by mouth every morning   Refills:  0       polyethylene glycol powder   Commonly known as:  MIRALAX/GLYCOLAX        Dose:  1 capful   Take 1 capful by mouth daily   Refills:  0       RESTASIS OP        Dose:  1 drop   Apply 1 drop to eye 2 times daily   Refills:  0       SUDAFED PO        Dose:  60 mg   Take 60 mg by mouth 2 times daily as needed   Refills:  0       SULFASALAZINE PO        Dose:  2000 mg   Take 2,000 mg by mouth 2 times daily (patient takes 4 X 500 = 2,000 mg dose)   Refills:  0       TYLENOL PO        Dose:  650 mg   Take 650 mg by mouth 2 times daily as needed for mild pain   Refills:  0       VITAMIN D PO        Dose:  1 capsule   Take 1 capsule by mouth every morning   Refills:  0            Where to get your medicines      These medications were sent to [facility] - [address]  [address], [place] [address]     Phone:  [phone]     enoxaparin 40 MG/0.4ML injection    senna-docusate 8.6-50 MG per tablet         Some of these will need a paper prescription and others can be bought over the counter. Ask your nurse if you have questions.     Bring a paper prescription for each of these medications     order for DME    oxyCODONE-acetaminophen 5-325 MG per tablet                Protect others around you: Learn how to safely use, store and throw away your medicines at www.disposemymeds.org.             Medication List: This is a list of all your medications and when to take them. Check marks below indicate your  daily home schedule. Keep this list as a reference.      Medications           Morning Afternoon Evening Bedtime As Needed    ASPIRIN PO   Take 81 mg by mouth every morning   Last time this was given:  81 mg on 5/23/2017  9:35 AM   Next Dose Due:  05/24                                   ATENOLOL PO   Take 50 mg by mouth every morning   Last time this was given:  50 mg on 5/23/2017  9:36 AM   Next Dose Due:  05/24                                   azelastine 0.1 % spray   Commonly known as:  ASTELIN   Spray 1 spray into both nostrils daily as needed                                   CALCIUM-VITAMIN D PO   Take 1 tablet by mouth 2 times daily   Last time this was given:  1 tablet on 5/23/2017  9:36 AM   Next Dose Due:  05/23                                   CRANBERRY PO   Take 1 capsule by mouth At Bedtime   Next Dose Due:  05/23                                   enoxaparin 40 MG/0.4ML injection   Commonly known as:  LOVENOX   Inject 0.4 mLs (40 mg) Subcutaneous every 24 hours   Last time this was given:  40 mg on 5/23/2017  9:35 AM   Next Dose Due:  05/24                                   fluticasone 50 MCG/ACT spray   Commonly known as:  FLONASE   Spray 1 spray into both nostrils daily   Last time this was given:  1 spray on 5/23/2017  9:45 AM   Next Dose Due:  05/24                                   FOLIC ACID PO   Take 1 mg by mouth every morning   Last time this was given:  1 mg on 5/23/2017  9:36 AM   Next Dose Due:  05/24                                   FUROSEMIDE PO   Take 20 mg by mouth every morning   Last time this was given:  20 mg on 5/23/2017  9:36 AM   Next Dose Due:  05/24                                   GEMFIBROZIL PO   Take 600 mg by mouth 2 times daily (before meals)   Last time this was given:  600 mg on 5/23/2017  6:50 AM   Next Dose Due:  05/23                                   K-DUR PO   Take 20 mEq by mouth 2 times daily   Last time this was given:  20 mEq on 5/23/2017  9:35 AM   Next Dose  Due:  05/23                                   LOVASTATIN PO   Take 40 mg by mouth every morning (patient takes 2 X 20 mg = 40 mg dose)   Next Dose Due:  05/24                                   MAGNESIUM OXIDE PO   Take 400 mg by mouth 2 times daily   Last time this was given:  400 mg on 5/23/2017  9:35 AM   Next Dose Due:  05/23                                   Multi-vitamin Tabs tablet   Take 1 tablet by mouth every morning   Last time this was given:  1 tablet on 5/23/2017  9:36 AM   Next Dose Due:  05/24                                   OMEGA 3 PO   Take 1 capsule by mouth 2 times daily   Last time this was given:  1,000 mg on 5/23/2017  9:36 AM   Next Dose Due:  05/23                                   order for DME   Equipment being ordered: Walker Wheels () and Walker () Treatment Diagnosis: Difficulty walking                                OXYBUTYNIN CHLORIDE ER PO   Take 5 mg by mouth every morning   Last time this was given:  5 mg on 5/23/2017  9:36 AM   Next Dose Due:  05/24                                   oxyCODONE-acetaminophen 5-325 MG per tablet   Commonly known as:  PERCOCET   Take 1-2 tablets by mouth every 4 hours as needed   Next Dose Due:  5 pm. This evening.                                   polyethylene glycol powder   Commonly known as:  MIRALAX/GLYCOLAX   Take 1 capful by mouth daily   Next Dose Due:  05/24                                   RESTASIS OP   Apply 1 drop to eye 2 times daily   Last time this was given:  1 drop on 5/23/2017  9:45 AM   Next Dose Due:  05/23                                   senna-docusate 8.6-50 MG per tablet   Commonly known as:  SENOKOT-S;PERICOLACE   Take 1-2 tablets by mouth 2 times daily   Last time this was given:  2 tablets on 5/23/2017  9:36 AM   Next Dose Due:  05/23                                   SUDAFED PO   Take 60 mg by mouth 2 times daily as needed                                   SULFASALAZINE PO   Take 2,000 mg by mouth 2 times daily  (patient takes 4 X 500 = 2,000 mg dose)   Next Dose Due:  05/23                                   TYLENOL PO   Take 650 mg by mouth 2 times daily as needed for mild pain   Last time this was given:  975 mg on 5/23/2017  2:04 PM                                   VITAMIN D PO   Take 1 capsule by mouth every morning   Last time this was given:  2,000 Units on 5/23/2017  9:35 AM   Next Dose Due:  05/24

## 2017-05-22 NOTE — ANESTHESIA PREPROCEDURE EVALUATION
Anesthesia Evaluation     . Pt has had prior anesthetic. Type: General           ROS/MED HX    ENT/Pulmonary:     (+)sleep apnea, , . .    Neurologic:       Cardiovascular:     (+) hypertension----. : . . . :. .       METS/Exercise Tolerance:     Hematologic:     (+) Anemia, Other Hematologic Disorder-Thrombocytopenia      Musculoskeletal:   (+) arthritis, , , -       GI/Hepatic:     (+) Inflammatory bowel disease, hepatitis type Other, liver disease,       Renal/Genitourinary:         Endo:     (+) Obesity, .      Psychiatric:         Infectious Disease:         Malignancy:         Other:    (+) H/O Chronic Pain,                                  Anesthesia Plan      History & Physical Review  History and physical reviewed and following examination; no interval change.    ASA Status:  3 .        Plan for General and Periph. Nerve Block for postop pain with Intravenous induction. Maintenance will be TIVA.    PONV prophylaxis:  Ondansetron (or other 5HT-3) and Dexamethasone or Solumedrol  Propofol, Zofran, and decadron      Postoperative Care  Postoperative pain management:  IV analgesics and Oral pain medications.      Consents  Anesthetic plan, risks, benefits and alternatives discussed with:  Patient..                          .

## 2017-05-23 ENCOUNTER — APPOINTMENT (OUTPATIENT)
Dept: PHYSICAL THERAPY | Facility: CLINIC | Age: 72
DRG: 470 | End: 2017-05-23
Attending: ORTHOPAEDIC SURGERY
Payer: MEDICARE

## 2017-05-23 VITALS
HEIGHT: 65 IN | SYSTOLIC BLOOD PRESSURE: 121 MMHG | TEMPERATURE: 98.5 F | DIASTOLIC BLOOD PRESSURE: 67 MMHG | BODY MASS INDEX: 23.17 KG/M2 | WEIGHT: 139.1 LBS | OXYGEN SATURATION: 97 % | HEART RATE: 70 BPM | RESPIRATION RATE: 16 BRPM

## 2017-05-23 PROCEDURE — 25000128 H RX IP 250 OP 636: Performed by: PHYSICIAN ASSISTANT

## 2017-05-23 PROCEDURE — 40000193 ZZH STATISTIC PT WARD VISIT: Performed by: PHYSICAL THERAPIST

## 2017-05-23 PROCEDURE — 97161 PT EVAL LOW COMPLEX 20 MIN: CPT | Mod: GP | Performed by: PHYSICAL THERAPIST

## 2017-05-23 PROCEDURE — 97530 THERAPEUTIC ACTIVITIES: CPT | Mod: GP | Performed by: PHYSICAL THERAPIST

## 2017-05-23 PROCEDURE — 25000132 ZZH RX MED GY IP 250 OP 250 PS 637: Mod: GY | Performed by: PHYSICIAN ASSISTANT

## 2017-05-23 PROCEDURE — A9270 NON-COVERED ITEM OR SERVICE: HCPCS | Mod: GY | Performed by: PHYSICIAN ASSISTANT

## 2017-05-23 PROCEDURE — 25000125 ZZHC RX 250: Performed by: PHYSICIAN ASSISTANT

## 2017-05-23 PROCEDURE — S0077 INJECTION, CLINDAMYCIN PHOSP: HCPCS | Performed by: PHYSICIAN ASSISTANT

## 2017-05-23 PROCEDURE — 97116 GAIT TRAINING THERAPY: CPT | Mod: GP | Performed by: PHYSICAL THERAPIST

## 2017-05-23 RX ORDER — AMOXICILLIN 250 MG
1-2 CAPSULE ORAL 2 TIMES DAILY
Qty: 40 TABLET | Refills: 1 | Status: SHIPPED | OUTPATIENT
Start: 2017-05-23 | End: 2020-03-04

## 2017-05-23 RX ORDER — OXYCODONE AND ACETAMINOPHEN 5; 325 MG/1; MG/1
1-2 TABLET ORAL EVERY 4 HOURS PRN
Qty: 60 TABLET | Refills: 0 | Status: SHIPPED | OUTPATIENT
Start: 2017-05-23 | End: 2020-03-04

## 2017-05-23 RX ADMIN — Medication 1000 MG: at 09:36

## 2017-05-23 RX ADMIN — SENNOSIDES AND DOCUSATE SODIUM 2 TABLET: 8.6; 5 TABLET ORAL at 09:36

## 2017-05-23 RX ADMIN — OXYBUTYNIN CHLORIDE 5 MG: 5 TABLET, EXTENDED RELEASE ORAL at 09:36

## 2017-05-23 RX ADMIN — CYCLOSPORINE 1 DROP: 0.5 EMULSION OPHTHALMIC at 09:45

## 2017-05-23 RX ADMIN — ACETAMINOPHEN 975 MG: 325 TABLET, FILM COATED ORAL at 06:48

## 2017-05-23 RX ADMIN — OXYCODONE HYDROCHLORIDE 10 MG: 5 TABLET ORAL at 10:49

## 2017-05-23 RX ADMIN — FOLIC ACID 1 MG: 1 TABLET ORAL at 09:36

## 2017-05-23 RX ADMIN — HYDROMORPHONE HYDROCHLORIDE 0.5 MG: 1 INJECTION, SOLUTION INTRAMUSCULAR; INTRAVENOUS; SUBCUTANEOUS at 12:14

## 2017-05-23 RX ADMIN — FUROSEMIDE 20 MG: 20 TABLET ORAL at 09:36

## 2017-05-23 RX ADMIN — VITAMIN D, TAB 1000IU (100/BT) 2000 UNITS: 25 TAB at 09:35

## 2017-05-23 RX ADMIN — ATENOLOL 50 MG: 50 TABLET ORAL at 09:36

## 2017-05-23 RX ADMIN — POLYETHYLENE GLYCOL 3350 17 G: 17 POWDER, FOR SOLUTION ORAL at 09:37

## 2017-05-23 RX ADMIN — ASPIRIN 81 MG 81 MG: 81 TABLET ORAL at 09:35

## 2017-05-23 RX ADMIN — OXYCODONE HYDROCHLORIDE 10 MG: 5 TABLET ORAL at 06:50

## 2017-05-23 RX ADMIN — POTASSIUM CHLORIDE 20 MEQ: 1500 TABLET, EXTENDED RELEASE ORAL at 09:35

## 2017-05-23 RX ADMIN — MULTIPLE VITAMINS W/ MINERALS TAB 1 TABLET: TAB at 09:36

## 2017-05-23 RX ADMIN — CLINDAMYCIN PHOSPHATE 900 MG: 18 INJECTION, SOLUTION INTRAVENOUS at 04:11

## 2017-05-23 RX ADMIN — SODIUM CHLORIDE, POTASSIUM CHLORIDE, SODIUM LACTATE AND CALCIUM CHLORIDE: 600; 310; 30; 20 INJECTION, SOLUTION INTRAVENOUS at 04:11

## 2017-05-23 RX ADMIN — ENOXAPARIN SODIUM 40 MG: 40 INJECTION SUBCUTANEOUS at 09:35

## 2017-05-23 RX ADMIN — ONDANSETRON 4 MG: 4 TABLET, ORALLY DISINTEGRATING ORAL at 01:24

## 2017-05-23 RX ADMIN — MAGNESIUM OXIDE TAB 400 MG (241.3 MG ELEMENTAL MG) 400 MG: 400 (241.3 MG) TAB at 09:35

## 2017-05-23 RX ADMIN — OXYCODONE HYDROCHLORIDE 10 MG: 5 TABLET ORAL at 15:08

## 2017-05-23 RX ADMIN — ACETAMINOPHEN 975 MG: 325 TABLET, FILM COATED ORAL at 14:04

## 2017-05-23 RX ADMIN — OYSTER SHELL CALCIUM WITH VITAMIN D 1 TABLET: 500; 200 TABLET, FILM COATED ORAL at 09:36

## 2017-05-23 RX ADMIN — GEMFIBROZIL 600 MG: 600 TABLET ORAL at 06:50

## 2017-05-23 RX ADMIN — Medication 1 SPRAY: at 09:45

## 2017-05-23 RX ADMIN — OXYCODONE HYDROCHLORIDE 10 MG: 5 TABLET ORAL at 01:25

## 2017-05-23 NOTE — PLAN OF CARE
Problem: Goal Outcome Summary  Goal: Goal Outcome Summary  Outcome: No Change  Care Plan Summary Note: Pt A&Ox4 , VSS intact, afebrile. CMS intact. C/o  Mild LLE discomfort managed by tylenol. CPAP at HS. 50% WB.  Denies Nausea, vomiting, headache, SOB, dyspnea. Regular diet, IVF infusing.  Continue to monitor.

## 2017-05-23 NOTE — PROGRESS NOTES
Jany Scherer  2017  POD #1 L TAA    Doing well.  No immediate surgical complications identified.  No excessive bleeding  Pain well-controlled.  Temperatures:  Current - Temp: 99.2  F (37.3  C); Max - Temp  Av.1  F (36.2  C)  Min: 95.7  F (35.4  C)  Max: 99.2  F (37.3  C)  Pulse range: Pulse  Av.5  Min: 63  Max: 90  Blood pressure range: Systolic (24hrs), Av , Min:120 , Max:147   ; Diastolic (24hrs), Av, Min:60, Max:85    CMS: grossly intact to L LE  Labs:none    PLAN:DC home today/tomorrow.

## 2017-05-23 NOTE — PLAN OF CARE
Problem: Goal Outcome Summary  Goal: Goal Outcome Summary  OT: Chart reviewed and discussed performance with PT. Patient without a need for OT at this time.

## 2017-05-23 NOTE — PROGRESS NOTES
05/23/17 1107   Quick Adds   Type of Visit Initial PT Evaluation   Living Environment   Lives With spouse   Living Arrangements house   Home Accessibility stairs to enter home;stairs within home;bed and bath on same level;grab bars present (toilet)   Number of Stairs to Enter Home 2   Number of Stairs Within Home 12  (basement, need not access)   Transportation Available family or friend will provide   Self-Care   Usual Activity Tolerance good   Current Activity Tolerance moderate   Regular Exercise no   Equipment Currently Used at Home walker, standard   Activity/Exercise/Self-Care Comment pt has SW and 4 WW at home, will need FWW   Functional Level Prior   Ambulation 0-->independent   Transferring 0-->independent   Toileting 0-->independent   Bathing 0-->independent   Dressing 0-->independent   Fall history within last six months no   Which of the above functional risks had a recent onset or change? ambulation   General Information   Onset of Illness/Injury or Date of Surgery - Date 05/22/17   Referring Physician [place]   Patient/Family Goals Statement return home   Pertinent History of Current Problem (include personal factors and/or comorbidities that impact the POC) s/p L TAA   Precautions/Limitations fall precautions   Weight-Bearing Status - LLE partial weight-bearing (% in comments)  (50% foot flat no PO)   Cognitive Status Examination   Orientation orientation to person, place and time   Level of Consciousness alert   Follows Commands and Answers Questions 100% of the time   Personal Safety and Judgment intact   Memory intact   Range of Motion (ROM)   ROM Comment L ankle s/p TAA   Strength   Strength Comments 4/5 LE strength grossly   Bed Mobility   Bed Mobility Comments SBA   Transfer Skills   Transfer Comments CGA   Gait   Gait Comments CGA w/ WW, step to pattern L PWB   Balance   Balance Comments impaired in standing due to pain and wB restrictions   General Therapy Interventions   Planned Therapy  "Interventions bed mobility training;gait training;transfer training   Clinical Impression   Criteria for Skilled Therapeutic Intervention yes, treatment indicated   PT Diagnosis difficulty walking   Influenced by the following impairments pain, dec ROm and strength   Functional limitations due to impairments impaired functional mobility   Clinical Presentation Stable/Uncomplicated   Clinical Presentation Rationale clinical observation   Clinical Decision Making (Complexity) Low complexity   Therapy Frequency` 2 times/day   Predicted Duration of Therapy Intervention (days/wks) 2 days   Anticipated Equipment Needs at Discharge front wheeled walker   Anticipated Discharge Disposition Home with Assist   Risk & Benefits of therapy have been explained Yes   Patient, Family & other staff in agreement with plan of care Yes   [facility] TM \"6 Clicks\"   2016, Trustees of [facility], under license to [employer].  All rights reserved.   6 Clicks Short Forms Basic Mobility Inpatient Short Form   [facility]  \"6 Clicks\" V.2 Basic Mobility Inpatient Short Form   1. Turning from your back to your side while in a flat bed without using bedrails? 3 - A Little   2. Moving from lying on your back to sitting on the side of a flat bed without using bedrails? 3 - A Little   3. Moving to and from a bed to a chair (including a wheelchair)? 3 - A Little   4. Standing up from a chair using your arms (e.g., wheelchair, or bedside chair)? 3 - A Little   5. To walk in hospital room? 3 - A Little   6. Climbing 3-5 steps with a railing? 2 - A Lot   Basic Mobility Raw Score (Score out of 24.Lower scores equate to lower levels of function) 17   Total Evaluation Time   Total Evaluation Time (Minutes) 15     "

## 2017-05-23 NOTE — PLAN OF CARE
Problem: Goal Outcome Summary  Goal: Goal Outcome Summary     PT: Orders received and chart reviewed. Eval completed. Prior to surgery patient reporting indep w/ mobility and daily activities living with spouse in single level home w/ 3 steps to enter. Pt s/p L TAA, with order for 50% PWB foot flat with no push off. Pt has PUW and 4WW at home, agreed to issue FWW. Pt req SBA for bed mobility and CGA for transfers.  Pt amb 25 feet using WW, L PWB and req CGA. Pt inst in stair navigation to simulate home, completing 3 steps w/ min A using single HR,  present for instruction. Pt would benefit from additional session with PT this PM prior to DC to home.     Surgeon Discharge Plan: Home     Current Functional Status: see above     Barriers to Plan/Home: 2-3 steps to enter home with single grab bar

## 2017-05-23 NOTE — PLAN OF CARE
Problem: Goal Outcome Summary  Goal: Goal Outcome Summary     PT: Pt demonstrating indep w/ bed mobility and mod indep with transfers using WW, maintaining L PWB. Pt amb 50ft w/ WW and SBA. Pt negotiated 3 steps w/ single handrail and WW, L PWB - pt demonstrating ability to safely negotiate step to gain access to home. DC to home anticipated.      Surgeon Discharge Plan: Home      Current Functional Status: see above      Barriers to Plan/Home: 2-3 steps to enter home with single grab bar        Physical Therapy Discharge Summary    Reason for therapy discharge:    Discharged to home.    Progress towards therapy goal(s). See goals on Care Plan in [facility] electronic health record for goal details.  Goals met    Therapy recommendation(s):    No further therapy is recommended.

## 2017-05-23 NOTE — PLAN OF CARE
Problem: Goal Outcome Summary  Goal: Goal Outcome Summary  Outcome: No Change  A&Ox4. VSS, CPAP overnight, 2L NC O2 during the day. CMS intact. Pain managed with PRN oxycodone. Regular diet. Due to void, tried several times with bedpan with no success. Dressing moist bloody drainage marked, no change overnight. Pt has previous colostomy bag, CDI, pt assists with emptying herself. Pt  stayed overnight, they live approx [place] miles from [facility], plan to assess pain this afternoon and possible discharge late afternoon.

## 2017-05-23 NOTE — DISCHARGE INSTRUCTIONS
Discharge Instructions Following Ankle Surgery    Activity Level:  1. Physical activity may be resumed gradually according to your comfort level.  2. Use you walker/crutches and limit the amount of weight you place on your foot, as instructed by your surgeon. Your surgeon will determine at your follow up visit when weight bearing restrictions will change or no longer apply.   3. It is recommended that you elevate your foot/ankle when lying or sitting to reduce swelling and pain.  4. It is also recommended that you apply ice to your foot/toes to help reduce swelling and discomfort.     Good Health Practices:  1. Maintain an adequate fluid intake and eat a well balanced diet to promote healing.  2. Surgery, decreased activity and pain medication all contribute to a decrease in bowel activity that can result in constipation. It is recommended that you increase your liquid intake, add fiber to your diet and add an over the counter laxative to your daily schedule if you experience these problems.  3. Keep your splint or cast clean and dry. WE recommend securing a plastic bag over your lower leg for showering to keep it dry.  4. Do not stick items between your cast and leg as this can cause skin or incision damage.    Follow Up:  1. If you have had a surgery performed by Dr. Bee, you should call to make a follow up appointment for 2 weeks from the date of surgery.  2. If your surgery was performed by one of our other doctors, please call to make your follow up appointment for 7-10 days from the date of surgery. Please call [phone] to schedule your appointment.       Total Ankle Arthroplasty and Ankle Fusion    JOSEPH Bee M.D.    This protocol provides you with general guidelines for initial stage and progression of rehabilitation according to specified time frames, related tissue tolerance and directional preference of movement. Specific changes in the program will be made by the physician as appropriate  for the individual patient.     REMEMBER: It can take up to a year to make a full recovery, and it is not unusual to have intermittent pains and aches during that time!     Phase I: Date of Surgery - 6 weeks   Objective: Healing, protection of joint replacement and fusions   Immobilization: cast, splint; After 2 week follow-up visit: removable boot   WB Status: partial weight bearing       Phase II: Week 6-8   Objective: Healing, protection of joint replacement and fusions   Immobilization: use of removable walker boot as needed   WB Status: weight bearing   Therapy: may be initiated towards the end of this phase, 1-2 x per week with a focus on swelling reduction, pain control, and early return of AROM, home care/exercise instructions for motion, pain/swelling control       Phase III: Week 8-16   Objective: Swelling reduction, increase in ROM, neuromuscular re-education, develop baseline of ankle control/strength   Immobilization: None   WB Status: WBAT, progressive reduction in crutch use, *NOTE - WB status and gait progression determined by physician based on radiographic evidence of implant incorporation and consolidation of distal tibiofibular fusion   Therapy: 1-2 x per week based on patient s initial presentation, frequency may be reduced as the patient exhibits good recovery and progress towards goals, instructions in home care and exercise to complement clinical care.     Rehab Program:    ROM - AROM, PROM, patient directed stretching, joint mobilization, *NOTE - joint mobilization should focus on techniques for general talocrural distraction and facilitating dorsiflexion and plantarflexion. Techniques for inversion and eversion should be minimized and may be contraindicated if the patient has had ancillary procedures such as subtalar fusion or triple arthrodesis. The distal tibiofibular syndesmosis should not be mobilized. Soft tissue techniques may be used for swelling reduction and scar tissue  mobilization. Goals for ROM are ? 10  of dorsiflexion and ? 40  of plantarflexion      Strength - techniques should begin with isometrics in four directions with progression to resistive band/isotonic strengthening for dorsiflexion and plantarflexion. Due to joint fusions, eversion and inversion strengthening should continue isometrically, bands should progress to heavy resistance as tolerated, swimming and biking allowed as tolerated      Proprioception - may begin with seated BAPS board and progress to standing balance assisted exercises as tolerated       PHASE IV: Week 16-24   Objective: functional ROM, good strength, adequate proprioception for stable balance, normalize gait, tolerate full day of ADLs/work, return to reasonable recreational activities   WB status: full, patient should exhibit normalized gait   Therapy: 1x every 2-4 weeks based on patient status and progression, to be discharged to an independent exercise program once goals are achieved, patient to be instructed in appropriate home exercise program     Rehab Program:      ROM - patient to achieve ? 10  of dorsiflexion and ? 40  of plantarflexion         Strength - progression to body weight resistance exercises with goal of ability to perform a single leg heel raise         Proprioception - patient should be instructed in proprioceptive drills that provide both visual and surface challenges to balance         Agility - cone/stick drills, leg press plyometrics, soft landing drills         Sports - prior to return to any running or jumping activity the patient must display a normalized gait and have strength to perform repetitive single leg     Any further questions should be directed to your physician. Please call to schedule your follow-up appointment ([phone])    Follow -up appointment:__________________________________________________  Nurse Signature:_________________________________Date:________Time:______  Patient  Signature:__________________________________Date:_____________

## 2017-05-23 NOTE — PLAN OF CARE
Problem: Pain, Acute (Adult)  Goal: Identify Related Risk Factors and Signs and Symptoms  Related risk factors and signs and symptoms are identified upon initiation of Human Response Clinical Practice Guideline (CPG)      Discharge to home. Will follow up with PCP in 2 weeks as scheduled. Discharge education/medications complete. Transport arranged with .

## 2020-01-22 ENCOUNTER — TRANSFERRED RECORDS (OUTPATIENT)
Dept: HEALTH INFORMATION MANAGEMENT | Facility: CLINIC | Age: 75
End: 2020-01-22

## 2020-03-04 ENCOUNTER — OFFICE VISIT (OUTPATIENT)
Dept: SURGERY | Facility: CLINIC | Age: 75
End: 2020-03-04
Payer: MEDICARE

## 2020-03-04 VITALS
WEIGHT: 226 LBS | SYSTOLIC BLOOD PRESSURE: 134 MMHG | DIASTOLIC BLOOD PRESSURE: 70 MMHG | BODY MASS INDEX: 37.65 KG/M2 | HEIGHT: 65 IN | HEART RATE: 119 BPM

## 2020-03-04 DIAGNOSIS — K43.5 PARASTOMAL HERNIA WITHOUT OBSTRUCTION OR GANGRENE: Primary | ICD-10-CM

## 2020-03-04 DIAGNOSIS — K43.2 INCISIONAL HERNIA, WITHOUT OBSTRUCTION OR GANGRENE: ICD-10-CM

## 2020-03-04 PROCEDURE — 99204 OFFICE O/P NEW MOD 45 MIN: CPT | Performed by: SURGERY

## 2020-03-04 RX ORDER — VITAMIN B COMPLEX
100 TABLET ORAL EVERY MORNING
COMMUNITY

## 2020-03-04 RX ORDER — TURMERIC 400 MG
400 CAPSULE ORAL EVERY MORNING
COMMUNITY

## 2020-03-04 RX ORDER — COVID-19 ANTIGEN TEST
220 KIT MISCELLANEOUS PRN
COMMUNITY
End: 2023-01-24

## 2020-03-04 ASSESSMENT — MIFFLIN-ST. JEOR: SCORE: 1531.01

## 2020-03-05 DIAGNOSIS — K43.5 PARASTOMAL HERNIA WITHOUT OBSTRUCTION OR GANGRENE: Primary | ICD-10-CM
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
## 2020-06-08 ENCOUNTER — TRANSFERRED RECORDS (OUTPATIENT)
Dept: HEALTH INFORMATION MANAGEMENT | Facility: CLINIC | Age: 75
End: 2020-06-08

## 2020-07-20 ENCOUNTER — TRANSFERRED RECORDS (OUTPATIENT)
Dept: HEALTH INFORMATION MANAGEMENT | Facility: CLINIC | Age: 75
End: 2020-07-20

## 2020-11-08 ENCOUNTER — HEALTH MAINTENANCE LETTER (OUTPATIENT)
Age: 75
End: 2020-11-08

## 2021-05-24 DIAGNOSIS — Z11.59 ENCOUNTER FOR SCREENING FOR OTHER VIRAL DISEASES: ICD-10-CM

## 2021-06-11 ENCOUNTER — ANESTHESIA EVENT (OUTPATIENT)
Dept: SURGERY | Facility: CLINIC | Age: 76
End: 2021-06-11
Payer: MEDICARE

## 2021-06-11 RX ORDER — NYSTATIN 100000 [USP'U]/G
POWDER TOPICAL DAILY PRN
COMMUNITY

## 2021-06-11 RX ORDER — MAGNESIUM OXIDE 400 MG/1
800 TABLET ORAL EVERY EVENING
COMMUNITY

## 2021-06-11 RX ORDER — ASPIRIN 81 MG/1
81 TABLET ORAL EVERY MORNING
COMMUNITY

## 2021-06-11 RX ORDER — POTASSIUM CHLORIDE 1500 MG/1
20 TABLET, EXTENDED RELEASE ORAL 2 TIMES DAILY
COMMUNITY

## 2021-06-11 RX ORDER — METOPROLOL SUCCINATE 200 MG/1
200 TABLET, EXTENDED RELEASE ORAL EVERY MORNING
COMMUNITY

## 2021-06-11 RX ORDER — FLUTICASONE PROPIONATE 50 MCG
1 SPRAY, SUSPENSION (ML) NASAL 2 TIMES DAILY
COMMUNITY

## 2021-06-11 RX ORDER — SENNOSIDES 8.6 MG
1300 CAPSULE ORAL 2 TIMES DAILY PRN
COMMUNITY

## 2021-06-11 NOTE — PROGRESS NOTES
PTA medications updated by Medication Scribe prior to surgery via phone call with patient (last doses completed by Nurse)     Medication history sources: Patient, Surescripts and H&P  In the past week, patient estimated taking medication this percent of the time: Greater than 90%  Adherence assessment: N/A Not Observed    Significant changes made to the medication list:  None      Additional medication history information:   None    Medication reconciliation completed by provider prior to medication history? No    Time spent in this activity: 60 MINUTES    The information provided in this note is only as accurate as the sources available at the time of update(s)      Prior to Admission medications    Medication Sig Last Dose Taking? Auth Provider   acetaminophen (TYLENOL) 650 MG CR tablet Take 1,300 mg by mouth 2 times daily as needed for mild pain or fever (650MG X 2 = 1,300MG)  at PM Yes Reported, Patient   aspirin 81 MG EC tablet Take 81 mg by mouth every morning  at AM Yes Reported, Patient   calcium citrate and vitamin D (CITRACAL) 200-250 MG-UNIT TABS per tablet Take 1 tablet by mouth every morning  at AM Yes Reported, Patient   Cholecalciferol (VITAMIN D PO) Take 1 capsule by mouth every morning  at AM Yes Reported, Patient   Coenzyme Q10 (COQ10) 100 MG CAPS Take 100 mg by mouth every morning   at AM Yes Reported, Patient   cycloSPORINE (RESTASIS) 0.05 % ophthalmic emulsion Apply 1 drop to eye 2 times daily   at AM Yes Reported, Patient   fluticasone (FLONASE) 50 MCG/ACT nasal spray Spray 1 spray into both nostrils 2 times daily  at AM Yes Reported, Patient   folic acid (FOLVITE) 1 MG tablet Take 1 mg by mouth every morning   at AM Yes Reported, Patient   furosemide (LASIX) 20 MG tablet Take 40 mg by mouth every morning (200MG X 2 = 40MG)  at AM Yes Reported, Patient   gemfibrozil (LOPID) 600 MG tablet Take 600 mg by mouth 2 times daily (before meals)   at PM Yes Reported, Patient   lovastatin (MEVACOR) 20 MG  tablet Take 40 mg by mouth every morning (patient takes 2 X 20 mg = 40 mg dose)  at AM Yes Reported, Patient   magnesium oxide (MAG-OX) 400 MG tablet Take 800 mg by mouth every evening (400MG X 2 = 800MG)  at PM Yes Reported, Patient   metoprolol succinate ER (TOPROL-XL) 200 MG 24 hr tablet Take 200 mg by mouth every morning  at AM Yes Reported, Patient   multivitamin, therapeutic with minerals (MULTI-VITAMIN) TABS tablet Take 1 tablet by mouth every morning   at AM Yes Reported, Patient   naproxen sodium 220 MG capsule Take 220 mg by mouth as needed  at AM Yes Reported, Patient   nystatin (MYCOSTATIN) 153363 UNIT/GM external powder Apply topically every evening  at PM Yes Reported, Patient   omega 3 1000 MG CAPS Take 1 capsule by mouth 2 times daily   at AM Yes Reported, Patient   order for DME Equipment being ordered: Walker Wheels () and Walker ()  Treatment Diagnosis: Difficulty walking  Yes Cuca Bee MD   polyethylene glycol (MIRALAX/GLYCOLAX) powder Take 1 capful by mouth every morning   at AM Yes Reported, Patient   potassium chloride ER (K-TAB) 20 MEQ CR tablet Take 20 mEq by mouth 2 times daily  at PM Yes Reported, Patient   sulfaSALAzine (AZULFIDINE) 500 MG tablet Take 2,000 mg by mouth 2 times daily (patient takes 4 X 500 = 2,000 mg dose)  at PM Yes Reported, Patient   Turmeric 400 MG CAPS Take 400 mg by mouth every morning   at AM Yes Reported, Patient

## 2021-06-14 ENCOUNTER — HOSPITAL ENCOUNTER (OUTPATIENT)
Facility: CLINIC | Age: 76
Discharge: HOME | End: 2021-06-14
Attending: ORTHOPAEDIC SURGERY | Admitting: ORTHOPAEDIC SURGERY
Payer: MEDICARE

## 2021-06-14 ENCOUNTER — APPOINTMENT (OUTPATIENT)
Dept: GENERAL RADIOLOGY | Facility: CLINIC | Age: 76
End: 2021-06-14
Attending: ORTHOPAEDIC SURGERY
Payer: MEDICARE

## 2021-06-14 ENCOUNTER — ANESTHESIA (OUTPATIENT)
Dept: SURGERY | Facility: CLINIC | Age: 76
End: 2021-06-14
Payer: MEDICARE

## 2021-06-14 VITALS
WEIGHT: 236 LBS | TEMPERATURE: 97.4 F | SYSTOLIC BLOOD PRESSURE: 145 MMHG | HEART RATE: 81 BPM | BODY MASS INDEX: 39.32 KG/M2 | RESPIRATION RATE: 16 BRPM | HEIGHT: 65 IN | DIASTOLIC BLOOD PRESSURE: 79 MMHG | OXYGEN SATURATION: 98 %

## 2021-06-14 DIAGNOSIS — Z96.662 STATUS POST LEFT ANKLE JOINT REPLACEMENT: Primary | ICD-10-CM

## 2021-06-14 LAB — POTASSIUM SERPL-SCNC: 4 MMOL/L (ref 3.4–5.3)

## 2021-06-14 PROCEDURE — 370N000017 HC ANESTHESIA TECHNICAL FEE, PER MIN: Performed by: ORTHOPAEDIC SURGERY

## 2021-06-14 PROCEDURE — 999N000179 XR SURGERY CARM FLUORO LESS THAN 5 MIN W STILLS

## 2021-06-14 PROCEDURE — 250N000009 HC RX 250: Performed by: NURSE ANESTHETIST, CERTIFIED REGISTERED

## 2021-06-14 PROCEDURE — 250N000011 HC RX IP 250 OP 636: Performed by: ORTHOPAEDIC SURGERY

## 2021-06-14 PROCEDURE — 271N000001 HC OR GENERAL SUPPLY NON-STERILE: Performed by: ORTHOPAEDIC SURGERY

## 2021-06-14 PROCEDURE — 710N000009 HC RECOVERY PHASE 1, LEVEL 1, PER MIN: Performed by: ORTHOPAEDIC SURGERY

## 2021-06-14 PROCEDURE — 250N000009 HC RX 250: Performed by: ORTHOPAEDIC SURGERY

## 2021-06-14 PROCEDURE — C1713 ANCHOR/SCREW BN/BN,TIS/BN: HCPCS | Performed by: ORTHOPAEDIC SURGERY

## 2021-06-14 PROCEDURE — 250N000025 HC SEVOFLURANE, PER MIN: Performed by: ORTHOPAEDIC SURGERY

## 2021-06-14 PROCEDURE — 999N000141 HC STATISTIC PRE-PROCEDURE NURSING ASSESSMENT: Performed by: ORTHOPAEDIC SURGERY

## 2021-06-14 PROCEDURE — 710N000012 HC RECOVERY PHASE 2, PER MINUTE: Performed by: ORTHOPAEDIC SURGERY

## 2021-06-14 PROCEDURE — 250N000013 HC RX MED GY IP 250 OP 250 PS 637: Performed by: PHYSICIAN ASSISTANT

## 2021-06-14 PROCEDURE — 36415 COLL VENOUS BLD VENIPUNCTURE: CPT | Performed by: ANESTHESIOLOGY

## 2021-06-14 PROCEDURE — 272N000001 HC OR GENERAL SUPPLY STERILE: Performed by: ORTHOPAEDIC SURGERY

## 2021-06-14 PROCEDURE — 250N000011 HC RX IP 250 OP 636: Performed by: NURSE ANESTHETIST, CERTIFIED REGISTERED

## 2021-06-14 PROCEDURE — 258N000003 HC RX IP 258 OP 636: Performed by: NURSE ANESTHETIST, CERTIFIED REGISTERED

## 2021-06-14 PROCEDURE — 360N000083 HC SURGERY LEVEL 3 W/ FLUORO, PER MIN: Performed by: ORTHOPAEDIC SURGERY

## 2021-06-14 PROCEDURE — 250N000009 HC RX 250: Performed by: PHYSICIAN ASSISTANT

## 2021-06-14 PROCEDURE — 84132 ASSAY OF SERUM POTASSIUM: CPT | Performed by: ANESTHESIOLOGY

## 2021-06-14 RX ORDER — ONDANSETRON 4 MG/1
4 TABLET, ORALLY DISINTEGRATING ORAL
Status: DISCONTINUED | OUTPATIENT
Start: 2021-06-14 | End: 2021-06-14 | Stop reason: HOSPADM

## 2021-06-14 RX ORDER — PROPOFOL 10 MG/ML
INJECTION, EMULSION INTRAVENOUS PRN
Status: DISCONTINUED | OUTPATIENT
Start: 2021-06-14 | End: 2021-06-14

## 2021-06-14 RX ORDER — CLINDAMYCIN PHOSPHATE 900 MG/50ML
900 INJECTION, SOLUTION INTRAVENOUS
Status: COMPLETED | OUTPATIENT
Start: 2021-06-14 | End: 2021-06-14

## 2021-06-14 RX ORDER — HYDROMORPHONE HYDROCHLORIDE 1 MG/ML
.3-.5 INJECTION, SOLUTION INTRAMUSCULAR; INTRAVENOUS; SUBCUTANEOUS EVERY 5 MIN PRN
Status: DISCONTINUED | OUTPATIENT
Start: 2021-06-14 | End: 2021-06-14 | Stop reason: HOSPADM

## 2021-06-14 RX ORDER — BUPIVACAINE HYDROCHLORIDE 2.5 MG/ML
INJECTION, SOLUTION EPIDURAL; INFILTRATION; INTRACAUDAL PRN
Status: DISCONTINUED | OUTPATIENT
Start: 2021-06-14 | End: 2021-06-14 | Stop reason: HOSPADM

## 2021-06-14 RX ORDER — ONDANSETRON 4 MG/1
4-8 TABLET, ORALLY DISINTEGRATING ORAL EVERY 6 HOURS PRN
Qty: 15 TABLET | Refills: 0 | Status: SHIPPED | OUTPATIENT
Start: 2021-06-14 | End: 2022-12-01

## 2021-06-14 RX ORDER — NALOXONE HYDROCHLORIDE 0.4 MG/ML
0.2 INJECTION, SOLUTION INTRAMUSCULAR; INTRAVENOUS; SUBCUTANEOUS
Status: DISCONTINUED | OUTPATIENT
Start: 2021-06-14 | End: 2021-06-14 | Stop reason: HOSPADM

## 2021-06-14 RX ORDER — SODIUM CHLORIDE, SODIUM LACTATE, POTASSIUM CHLORIDE, CALCIUM CHLORIDE 600; 310; 30; 20 MG/100ML; MG/100ML; MG/100ML; MG/100ML
INJECTION, SOLUTION INTRAVENOUS CONTINUOUS
Status: DISCONTINUED | OUTPATIENT
Start: 2021-06-14 | End: 2021-06-14 | Stop reason: HOSPADM

## 2021-06-14 RX ORDER — ONDANSETRON 2 MG/ML
4 INJECTION INTRAMUSCULAR; INTRAVENOUS EVERY 30 MIN PRN
Status: DISCONTINUED | OUTPATIENT
Start: 2021-06-14 | End: 2021-06-14 | Stop reason: HOSPADM

## 2021-06-14 RX ORDER — LIDOCAINE 40 MG/G
CREAM TOPICAL
Status: DISCONTINUED | OUTPATIENT
Start: 2021-06-14 | End: 2021-06-14 | Stop reason: HOSPADM

## 2021-06-14 RX ORDER — ONDANSETRON 4 MG/1
4 TABLET, ORALLY DISINTEGRATING ORAL EVERY 30 MIN PRN
Status: DISCONTINUED | OUTPATIENT
Start: 2021-06-14 | End: 2021-06-14 | Stop reason: HOSPADM

## 2021-06-14 RX ORDER — IBUPROFEN 800 MG/1
800 TABLET, FILM COATED ORAL EVERY 8 HOURS PRN
Qty: 30 TABLET | Refills: 0 | Status: SHIPPED | OUTPATIENT
Start: 2021-06-14 | End: 2022-12-01

## 2021-06-14 RX ORDER — NALOXONE HYDROCHLORIDE 0.4 MG/ML
0.4 INJECTION, SOLUTION INTRAMUSCULAR; INTRAVENOUS; SUBCUTANEOUS
Status: DISCONTINUED | OUTPATIENT
Start: 2021-06-14 | End: 2021-06-14 | Stop reason: HOSPADM

## 2021-06-14 RX ORDER — AMOXICILLIN 250 MG
1-2 CAPSULE ORAL 2 TIMES DAILY
Qty: 30 TABLET | Refills: 0 | Status: SHIPPED | OUTPATIENT
Start: 2021-06-14 | End: 2022-12-01

## 2021-06-14 RX ORDER — FENTANYL CITRATE 50 UG/ML
INJECTION, SOLUTION INTRAMUSCULAR; INTRAVENOUS PRN
Status: DISCONTINUED | OUTPATIENT
Start: 2021-06-14 | End: 2021-06-14

## 2021-06-14 RX ORDER — HYDROCODONE BITARTRATE AND ACETAMINOPHEN 5; 325 MG/1; MG/1
1 TABLET ORAL
Status: COMPLETED | OUTPATIENT
Start: 2021-06-14 | End: 2021-06-14

## 2021-06-14 RX ORDER — SODIUM CHLORIDE, SODIUM LACTATE, POTASSIUM CHLORIDE, CALCIUM CHLORIDE 600; 310; 30; 20 MG/100ML; MG/100ML; MG/100ML; MG/100ML
INJECTION, SOLUTION INTRAVENOUS CONTINUOUS PRN
Status: DISCONTINUED | OUTPATIENT
Start: 2021-06-14 | End: 2021-06-14

## 2021-06-14 RX ORDER — FENTANYL CITRATE 50 UG/ML
25-50 INJECTION, SOLUTION INTRAMUSCULAR; INTRAVENOUS
Status: DISCONTINUED | OUTPATIENT
Start: 2021-06-14 | End: 2021-06-14 | Stop reason: HOSPADM

## 2021-06-14 RX ORDER — HYDROCODONE BITARTRATE AND ACETAMINOPHEN 5; 325 MG/1; MG/1
1-2 TABLET ORAL EVERY 4 HOURS PRN
Qty: 20 TABLET | Refills: 0 | Status: SHIPPED | OUTPATIENT
Start: 2021-06-14 | End: 2022-12-01

## 2021-06-14 RX ORDER — DEXAMETHASONE SODIUM PHOSPHATE 4 MG/ML
INJECTION, SOLUTION INTRA-ARTICULAR; INTRALESIONAL; INTRAMUSCULAR; INTRAVENOUS; SOFT TISSUE PRN
Status: DISCONTINUED | OUTPATIENT
Start: 2021-06-14 | End: 2021-06-14

## 2021-06-14 RX ORDER — ONDANSETRON 2 MG/ML
INJECTION INTRAMUSCULAR; INTRAVENOUS PRN
Status: DISCONTINUED | OUTPATIENT
Start: 2021-06-14 | End: 2021-06-14

## 2021-06-14 RX ORDER — LIDOCAINE HYDROCHLORIDE 20 MG/ML
INJECTION, SOLUTION INFILTRATION; PERINEURAL PRN
Status: DISCONTINUED | OUTPATIENT
Start: 2021-06-14 | End: 2021-06-14

## 2021-06-14 RX ORDER — CLINDAMYCIN PHOSPHATE 900 MG/50ML
900 INJECTION, SOLUTION INTRAVENOUS SEE ADMIN INSTRUCTIONS
Status: DISCONTINUED | OUTPATIENT
Start: 2021-06-14 | End: 2021-06-14 | Stop reason: HOSPADM

## 2021-06-14 RX ORDER — MAGNESIUM HYDROXIDE 1200 MG/15ML
LIQUID ORAL PRN
Status: DISCONTINUED | OUTPATIENT
Start: 2021-06-14 | End: 2021-06-14 | Stop reason: HOSPADM

## 2021-06-14 RX ADMIN — ONDANSETRON 4 MG: 2 INJECTION INTRAMUSCULAR; INTRAVENOUS at 09:19

## 2021-06-14 RX ADMIN — PHENYLEPHRINE HYDROCHLORIDE 100 MCG: 10 INJECTION INTRAVENOUS at 09:04

## 2021-06-14 RX ADMIN — HYDROCODONE BITARTRATE AND ACETAMINOPHEN 1 TABLET: 5; 325 TABLET ORAL at 10:32

## 2021-06-14 RX ADMIN — FENTANYL CITRATE 50 MCG: 50 INJECTION, SOLUTION INTRAMUSCULAR; INTRAVENOUS at 09:00

## 2021-06-14 RX ADMIN — MIDAZOLAM 1 MG: 1 INJECTION INTRAMUSCULAR; INTRAVENOUS at 08:56

## 2021-06-14 RX ADMIN — PROPOFOL 200 MG: 10 INJECTION, EMULSION INTRAVENOUS at 09:00

## 2021-06-14 RX ADMIN — PHENYLEPHRINE HYDROCHLORIDE 50 MCG: 10 INJECTION INTRAVENOUS at 09:05

## 2021-06-14 RX ADMIN — CLINDAMYCIN PHOSPHATE 900 MG: 900 INJECTION, SOLUTION INTRAVENOUS at 09:04

## 2021-06-14 RX ADMIN — FENTANYL CITRATE 50 MCG: 50 INJECTION, SOLUTION INTRAMUSCULAR; INTRAVENOUS at 09:11

## 2021-06-14 RX ADMIN — LIDOCAINE HYDROCHLORIDE 50 MG: 20 INJECTION, SOLUTION INFILTRATION; PERINEURAL at 09:00

## 2021-06-14 RX ADMIN — DEXAMETHASONE SODIUM PHOSPHATE 4 MG: 4 INJECTION, SOLUTION INTRA-ARTICULAR; INTRALESIONAL; INTRAMUSCULAR; INTRAVENOUS; SOFT TISSUE at 09:09

## 2021-06-14 RX ADMIN — SODIUM CHLORIDE, POTASSIUM CHLORIDE, SODIUM LACTATE AND CALCIUM CHLORIDE: 600; 310; 30; 20 INJECTION, SOLUTION INTRAVENOUS at 08:56

## 2021-06-14 ASSESSMENT — LIFESTYLE VARIABLES: TOBACCO_USE: 0

## 2021-06-14 ASSESSMENT — MIFFLIN-ST. JEOR: SCORE: 1571.37

## 2021-06-14 NOTE — ANESTHESIA POSTPROCEDURE EVALUATION
Patient: Jany Scherer    Procedure(s):  LEFT HALLUX RIGDUS REPAIR AND 2ND CLAWTOE REPAIR    Diagnosis:Senile arthritis [M19.90]  Diagnosis Additional Information: No value filed.    Anesthesia Type:  General    Note:     Postop Pain Control: Uneventful            Sign Out: Well controlled pain   PONV: No   Neuro/Psych: Uneventful            Sign Out: Acceptable/Baseline neuro status   Airway/Respiratory: Uneventful            Sign Out: Acceptable/Baseline resp. status   CV/Hemodynamics: Uneventful            Sign Out: Acceptable CV status; No obvious hypovolemia; No obvious fluid overload   Other NRE: NONE   DID A NON-ROUTINE EVENT OCCUR? No           Last vitals:  Vitals:    06/14/21 1015 06/14/21 1030 06/14/21 1120   BP: 136/76 112/68 (!) 145/79   Pulse: 73 63 81   Resp: 16 18 16   Temp:  36.3  C (97.3  F) 36.3  C (97.4  F)   SpO2: 99% 97% 98%       Last vitals prior to Anesthesia Care Transfer:  CRNA VITALS  6/14/2021 0906 - 6/14/2021 1006      6/14/2021             Resp Rate (observed):  12    EKG:  Sinus rhythm          Electronically Signed By: Amilcar Early MD  June 14, 2021  11:44 AM

## 2021-06-14 NOTE — ANESTHESIA CARE TRANSFER NOTE
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    Anesthesia Type:   General     Note:    Oropharynx: oropharynx clear of all foreign objects and spontaneously breathing  Level of Consciousness: awake  Oxygen Supplementation: face mask  Level of Supplemental Oxygen (L/min / FiO2): 6  Independent Airway: airway patency satisfactory and stable  Dentition: dentition unchanged  Vital Signs Stable: post-procedure vital signs reviewed and stable  Report to RN Given: handoff report given  Patient transferred to: PACU  Comments: Pt to PACU with O2 via mask, airway patent, VSS. Report to RN.  Handoff Report: Identifed the Patient, Identified the Reponsible Provider, Reviewed the pertinent medical history, Discussed the surgical course, Reviewed Intra-OP anesthesia mangement and issues during anesthesia, Set expectations for post-procedure period and Allowed opportunity for questions and acknowledgement of understanding      Vitals: (Last set prior to Anesthesia Care Transfer)  CRNA VITALS  6/14/2021 0906 - 6/14/2021 0941      6/14/2021             NIBP:  120/72    NIBP Mean:  104        Electronically Signed By: BULL Gabriel CRNA  June 14, 2021  9:41 AM

## 2021-06-14 NOTE — OR NURSING
"HPI:  Patient is a 43-year-old female who comes in today with complaints of sharp abdominal pain for the last 2 days associated with nausea.  She denies any vomiting.  She denies any fever, chills, sweats.  She has had no diarrhea.  She has been able to continue to eat throughout the illness.    Current meds have been verified and updated per the EMR  Exam:/80   Pulse 88   Temp 99.5 °F (37.5 °C) (Temporal)   Resp 20   Ht 5' 4" (1.626 m)   Wt (!) 166.7 kg (367 lb 8.1 oz)   BMI 63.08 kg/m²   Overall she is in no acute distress.  Abdomen soft, benign, no rebound or guarding.  She has some tenderness with deep palpation.  There is no organomegaly.  Negative Schmidt sign.        Impression:  Abdominal pain with nausea for the last 2 days.  Patient Active Problem List   Diagnosis    BMI 60.0-69.9, adult    Seasonal allergies    Low vitamin D level       Plan:  Orders Placed This Encounter    ondansetron (ZOFRAN) 4 MG tablet    traMADoL (ULTRAM) 50 mg tablet     She was given some tramadol and Zofran to use as needed for the next couple of days.  She has been encouraged to follow a clear liquid diet.  If her symptoms have not resolved in 3 days, she needs to let her primary care doctor know    This note is generated with speech recognition software and is subject to transcription error and sound alike phrases that may be missed by proofreading.      " VSS. A&O. Dressing intact, scant serosanguinous drainage to 2nd toe area. Denies pain. Sabiha PO. No N/V. DC instructions, meds and follow up reviewed with patient and . Questions answered. Able to teach back.

## 2021-06-14 NOTE — OP NOTE
Procedure Date: 06/14/2021    PREOPERATIVE DIAGNOSES:     1.  Left-sided hallux rigidus deformity.   2.  Fixed left second claw toe deformity.   3.  Flexible clawtoe deformity of the left third toe.    POSTOPERATIVE DIAGNOSES:  1.  Left-sided hallux rigidus deformity.   2.  Fixed left second claw toe deformity.   3.  Flexible clawtoe deformity of the left third toe.    PROCEDURES PERFORMED:   1.  Cheilectomy and debridement of the left great toe metatarsophalangeal joint.  2.  Left second clawtoe repair with proximal interphalangeal and distal interphalangeal joint excision arthroplasty and intramedullary screw fixation.  3.  Percutaneous flexor tenotomy of the third toe at the middle phalanx level.    ANESTHETIC:  General.    SURGEON:  Cuca Bee MD    ASSISTANT:  Jermaine Dickson     INDICATIONS FOR PROCEDURE:  Ms. Scherer presented with a left-sided hallux rigidus deformity.  She also has a fixed second and a flexible third claw toe deformity.    Informed consent was obtained for the above-mentioned procedures.    DESCRIPTION OF PROCEDURE:  After adequate induction of a general anesthetic, the patient was positioned supine on the operating table.  The left leg was sterilely prepped and free draped in the usual fashion.  Tourniquet around the thigh was inflated to 300 mmHg.    A longitudinal incision was made over the dorsum of the great toe MTP joint.  The extensor tendon was protected.  The joint was exposed.  There was a large dorsal and medial osteophyte over the metatarsal head.    A saw was used to do a generous cheilectomy dorsal and medial to remove the bone prominence and also increase dorsiflexion to about 90 degrees from preoperative to 45 degrees.  The remaining of the articular cartilage was normal.    This was followed by a longitudinal incision over the second toe.  PIP and DIP joint excision arthroplasty was done using a saw.  The toe was then reduced and immobilized with an intramedullary screw  with excellent alignment.  There was still medial deviation deformity at the MTP joint of the second toe.  A separate incision was made over the MTP joint and a complete medial capsular release done to help correct the alignment of the toe.    There was a flexible deformity of the third toe.  A percutaneous flexor tenotomy was done at the middle phalanx level to correct the deformity.    The tourniquet was deflated.  The wound was closed with nylon sutures.  A sterile dressing and a light compressive bandage applied.  She tolerated the procedure well and was taken to the recovery room in satisfactory condition.    She can ambulate weightbearing as tolerated.  Sutures will be removed in 2 weeks.    Cuca Bee MD        D: 2021   T: 2021   MT: [id number]    Name:     SWETHA CARO  MRN:      [id number]        Account:        [id number]   :      1945           Procedure Date: 2021     Document: [id number]

## 2021-06-14 NOTE — DISCHARGE INSTRUCTIONS
Same Day Surgery Discharge Instructions for  Sedation and General Anesthesia       It's not unusual to feel dizzy, light-headed or faint for up to 24 hours after surgery or while taking pain medication.  If you have these symptoms: sit for a few minutes before standing and have someone assist you when you get up to walk or use the bathroom.      You should rest and relax for the next 24 hours. We recommend you make arrangements to have an adult stay with you for at least 24 hours after your discharge.  Avoid hazardous and strenuous activity.      DO NOT DRIVE any vehicle or operate mechanical equipment for 24 hours following the end of your surgery.  Even though you may feel normal, your reactions may be affected by the medication you have received.      Do not drink alcoholic beverages for 24 hours following surgery.       Slowly progress to your regular diet as you feel able. It's not unusual to feel nauseated and/or vomit after receiving anesthesia.  If you develop these symptoms, drink clear liquids (apple juice, ginger ale, broth, 7-up, etc. ) until you feel better.  If your nausea and vomiting persists for 24 hours, please notify your surgeon.        All narcotic pain medications, along with inactivity and anesthesia, can cause constipation. Drinking plenty of liquids and increasing fiber intake will help.      For any questions of a medical nature, call your surgeon.      Do not make important decisions for 24 hours.      If you had general anesthesia, you may have a sore throat for a couple of days related to the breathing tube used during surgery.  You may use Cepacol lozenges to help with this discomfort.  If it worsens or if you develop a fever, contact your surgeon.       If you feel your pain is not well managed with the pain medications prescribed by your surgeon, please contact your surgeon's office to let them know so they can address your concerns.       CoVid 19 Information    We want to give you  information regarding Covid. Please consult your primary care provider with any questions you might have.     Patient who have symptoms (cough, fever, or shortness of breath), need to isolate for 7 days from when symptoms started OR 72 hours after fever resolves (without fever reducing medications) AND improvement of respiratory symptoms (whichever is longer).      Isolate yourself at home (in own room/own bathroom if possible)    Do Not allow any visitors    Do Not go to work or school    Do Not go to Mosque,  centers, shopping, or other public places.    Do Not shake hands.    Avoid close and intimate contact with others (hugging, kissing).    Follow CDC recommendations for household cleaning of frequently touched services.     After the initial 7 days, continue to isolate yourself from household members as much as possible. To continue decrease the risk of community spread and exposure, you and any members of your household should limit activities in public for 14 days after starting home isolation.     You can reference the following CDC link for helpful home isolation/care tips:  https://www.cdc.gov/coronavirus/2019-ncov/downloads/10Things.pdf    Protect Others:    Cover Your Mouth and Nose with a mask, disposable tissue or wash cloth to avoid spreading germs to others.    Wash your hands and face frequently with soap and water    Call Your Primary Doctor If: Breathing difficulty develops or you become worse.    For more information about COVID19 and options for caring for yourself at home, please visit the CDC website at https://www.cdc.gov/coronavirus/2019-ncov/about/steps-when-sick.html  For more options for care at [facility], please visit our website at [email or web address]          POST-OPERATIVE INSTRUCTIONS  FOOT AND ANKLE SURGERY  JOSEPH Bee M.D.  Jermaine Dickson P.A.-C    These precautions MUST be followed for the first 24 hours after surgery:    Upon  discharge, go directly home.    You must make arrangements to have a responsible adult stay with you.    DO NOT DRINK ALCOHOLIC BEVERAGES    It is not unusual to feel lightheaded up to 24 hours after surgery or while taking pain medication.  If you feel lightheaded, sit for a few minutes before standing and have someone assisted you when you get up to walk or use the restroom.    Do not use any mechanical equipment.    Do not make any important or legal decisions for 24 hours or while on pain medications.    You may experience dry mouth, sore throat, or sleep disturbances from the anesthesia and medications used during surgery.  Generalized muscle aches can sometimes occur.  These usually disappear in 12-24 hours.    POST-OPERATIVE CARE GUIDELINES    The following are general guidelines about what to expect the first days/weeks after surgery.  They are not specific, and your recovery may be slightly different.    Blood clots are not common, but are emergencies.  If you have sudden onset pain in your calf or leg, or have sudden shortness of breath, go to the Emergency Department.      Elevation of your operative foot/ankle is key to reducing the swelling in the immediate post-operative phase (first 3-5 days).  When you are at rest, elevate your foot at or above the level of your heart.  When sitting, your foot should be elevated on a chair or stool; not hanging down.      You should plan to rest the day after surgery no matter how minor the procedure.  More complicated procedures will require more time to return to normal activity.      Foot and ankle surgery is painful in most cases.   It is not unusual for the pain to be worse a day or two after surgery than on the day of.  If your pain is more than 8/10 contact our office.  If you don t have pain, gradually decrease your pain meds by substituting Tylenol.  Please don t use Advil/ibuprofen unless we order it for you.  Pt may resume regular home medications of:  ALL  PRESCRIBED BLOOD THINNERS (INCLUDING ASA 81MG) on the day after surgery.        You will be prescribed Percocet or Vicodin for pain, Vistaril for spasms/pain adjunct, Senokot for constipation.  If you have had trouble taking these meds before or experience nausea or vomiting after surgery from your medication, please advise the recovery room nurses.  If you are already at home, try drinking only clear liquids and/or call our office.  Start taking narcotic pain medication when you feel sensation in your lower extremity after a block.       All pain medications, along with inactivity can cause constipation.  Use the Senakot as directed, increase fluid intake to 1 quart per day and increase your dietary fiber.  (The  P  fruits - peaches, plums, pears, and prunes as well as anise/black licorice are recommended.)    It is not unusual to run a low-grade fever after surgery.  If your temperature is elevated above 102 , lasts longer than 24 hours, or is questionable in any way, contact our office.      Drainage from your cast/dressing is normal.  Reinforce your cast/dressing with 4x4 dressings and cover with an Ace wrap.  Wait until 24 hours after surgery to change your dressings; by this time most of your bleeding will have stopped.  If you have drainage through your dressings 2 days after surgery, contact our office.      You cast/dressing will be changed at your 2-week follow up appointment.  They should be kept clean and DRY.  If your cast/dressing is damaged before that, contact our office.      It is normal to experience some bruising in the toes after surgery and they may appear  dark  when your foot hangs down.  It is important to actively wiggle your toes for at least 5-10 minutes each hour UNLESS you had surgery on those toes.      Use ice on your foot/ankle over the dressing/cast for 20 minutes per hour, 10 or more times per day.  A large bag of frozen peas works well.      Bathing: take a tub or sponge bath  instead of a shower if possible during the first two weeks.  If you choose to shower, cover the dressing/cast with a waterproof covering, these may be ordered from www.seal-tight.com or are available at some pharmacies/medical supply stores.  Another option is XeroSox, which is the original vacuum sealed bandage and cast cover.  The cast cover has a vacuum seal and is absolutely waterproof.  5-071-026-2297 or www.xerosox.[employer] for more information.      Driving:  For surgery on the left foot/ankle, you may drive as soon as narcotic medications are stopped.  For surgery on the right foot/ankle, you may drive when you are out of a cast, off pain medications, and you feel secure with braking.      In general, listen to your foot/ankle.  If you have a sudden, dramatic increase in pain that does not resolve after an hour or so, something is wrong - call our office.  If you fall or bump your foot/ankle and have sudden pain that resolves, give it 24 hours before you call.      Many of your questions can be addressed at your 2-week follow-up appointment - please make a list of things to ask us as they come up during your recovery.      If you had a nerve block it is common to have numbness in your leg and foot for up to 30 hours after surgery.  If your leg or foot is still numb more than 30 hours after surgery, please call the office.      FUTURE DENTIST VISITS:  If you have had a total ankle arthroplasty please be advised you must be on antibiotics prior to ANY dental work.  Please call your dentist office ahead of time and make them aware of this as your dentist will be able to order the prescription.  If you have had any other surgery this is not a concern.    If you have any further questions or concerns, please call our office at [phone]      Pt may resume regular home medications of:  ALL PRESCRIBED BLOOD THINNERS (INCLUDING ASA 81MG)   on the day after surgery.
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
## 2021-09-11 ENCOUNTER — HEALTH MAINTENANCE LETTER (OUTPATIENT)
Age: 76
End: 2021-09-11

## 2022-01-01 ENCOUNTER — HEALTH MAINTENANCE LETTER (OUTPATIENT)
Age: 77
End: 2022-01-01

## 2022-02-26 ENCOUNTER — HEALTH MAINTENANCE LETTER (OUTPATIENT)
Age: 77
End: 2022-02-26

## 2022-10-13 ENCOUNTER — HOSPITAL ENCOUNTER (INPATIENT)
Facility: CLINIC | Age: 77
Setting detail: SURGERY ADMIT
End: 2022-10-13
Attending: ORTHOPAEDIC SURGERY | Admitting: ORTHOPAEDIC SURGERY
Payer: MEDICARE

## 2022-10-30 ENCOUNTER — HEALTH MAINTENANCE LETTER (OUTPATIENT)
Age: 77
End: 2022-10-30

## 2022-12-01 RX ORDER — DILTIAZEM HYDROCHLORIDE 120 MG/1
120 CAPSULE, EXTENDED RELEASE ORAL DAILY
COMMUNITY
Start: 2021-07-23 | End: 2022-12-13 | Stop reason: HOSPADM

## 2022-12-13 VITALS — WEIGHT: 231 LBS | BODY MASS INDEX: 39.44 KG/M2 | HEIGHT: 64 IN

## 2022-12-13 RX ORDER — CEFAZOLIN SODIUM/WATER 2 G/20 ML
2 SYRINGE (ML) INTRAVENOUS
Status: CANCELLED | OUTPATIENT
Start: 2022-12-13

## 2022-12-13 RX ORDER — GABAPENTIN 100 MG/1
100 CAPSULE ORAL
Status: CANCELLED | OUTPATIENT
Start: 2022-12-13

## 2022-12-13 RX ORDER — TRANEXAMIC ACID 10 MG/ML
5 INJECTION, SOLUTION INTRAVENOUS CONTINUOUS
Status: CANCELLED | OUTPATIENT
Start: 2022-12-13

## 2022-12-13 RX ORDER — CEFAZOLIN SODIUM/WATER 2 G/20 ML
2 SYRINGE (ML) INTRAVENOUS SEE ADMIN INSTRUCTIONS
Status: CANCELLED | OUTPATIENT
Start: 2022-12-13

## 2022-12-13 NOTE — PHARMACY-ADMISSION MEDICATION HISTORY
Medication history and patient interview completed by pre-admitting nurse (Laina Braun RN). Reviewed by pharmacist. No further clarifications needed.    Zoran Green RPh    Prior to Admission medications    Medication Sig Last Dose Taking? Auth Provider Long Term End Date   acetaminophen (TYLENOL) 650 MG CR tablet Take 1,300 mg by mouth 2 times daily as needed for mild pain or fever (650MG X 2 = 1,300MG)  Yes Reported, Patient     aspirin 81 MG EC tablet Take 81 mg by mouth every morning  Yes Reported, Patient     calcium citrate and vitamin D (CITRACAL) 200-250 MG-UNIT TABS per tablet Take 1 tablet by mouth every morning  Yes Reported, Patient     Coenzyme Q10 (COQ10) 100 MG CAPS Take 100 mg by mouth every morning   Yes Reported, Patient     cycloSPORINE (RESTASIS) 0.05 % ophthalmic emulsion Apply 1 drop to eye 2 times daily   Yes Reported, Patient     diltiazem ER (DILT-XR) 120 MG 24 hr capsule Take 120 mg by mouth daily  Yes Reported, Patient Yes    fluticasone (FLONASE) 50 MCG/ACT nasal spray Spray 1 spray into both nostrils 2 times daily  Yes Reported, Patient     folic acid (FOLVITE) 1 MG tablet Take 1 mg by mouth every morning   Yes Reported, Patient     furosemide (LASIX) 20 MG tablet Take 40 mg by mouth every morning  Yes Reported, Patient Yes    gemfibrozil (LOPID) 600 MG tablet Take 600 mg by mouth 2 times daily (before meals)   Yes Reported, Patient Yes    lovastatin (MEVACOR) 20 MG tablet Take 40 mg by mouth At Bedtime  Yes Reported, Patient Yes    magnesium oxide (MAG-OX) 400 MG tablet Take 800 mg by mouth every evening (400MG X 2 = 800MG)  Yes Reported, Patient     metoprolol succinate ER (TOPROL-XL) 200 MG 24 hr tablet Take 200 mg by mouth every morning  Yes Reported, Patient Yes    Multiple Vitamins-Minerals (PRESERVISION AREDS PO) Take by mouth daily  Yes Reported, Patient     multivitamin w/minerals (THERA-VIT-M) tablet Take 1 tablet by mouth every morning   Yes Reported, Patient      naproxen sodium 220 MG capsule Take 220 mg by mouth as needed  Yes Reported, Patient     nystatin (MYCOSTATIN) 761804 UNIT/GM external powder Apply topically daily as needed  Yes Reported, Patient     omega 3 1000 MG CAPS Take 1 capsule by mouth 2 times daily   Yes Reported, Patient     polyethylene glycol (MIRALAX/GLYCOLAX) powder Take 1 capful by mouth every morning   Yes Reported, Patient     potassium chloride ER (K-TAB) 20 MEQ CR tablet Take 20 mEq by mouth 2 times daily  Yes Reported, Patient     sulfaSALAzine (AZULFIDINE) 500 MG tablet Take 1,000 mg by mouth 3 times daily  Yes Reported, Patient     Turmeric 400 MG CAPS Take 400 mg by mouth every morning   Yes Reported, Patient     order for DME Equipment being ordered: Walker Wheels () and Walker ()  Treatment Diagnosis: Difficulty walking   Cuca Bee MD

## 2023-02-02 RX ORDER — DILTIAZEM HYDROCHLORIDE 120 MG/1
120 CAPSULE, EXTENDED RELEASE ORAL DAILY
COMMUNITY
Start: 2023-01-30

## 2023-02-02 NOTE — PHARMACY-ADMISSION MEDICATION HISTORY
Medication history and patient interview completed by pre-admitting nurse. Reviewed by pharmacist. No further clarifications needed.    [facility]    Nurse Complete Laina Braun RN Tue Jan 24, 2023  3:56 PM       Prior to Admission medications    Medication Sig Last Dose Taking? Auth Provider Long Term End Date   acetaminophen (TYLENOL) 650 MG CR tablet Take 1,300 mg by mouth 2 times daily as needed for mild pain or fever (650MG X 2 = 1,300MG)  Yes Reported, Patient     aspirin 81 MG EC tablet Take 81 mg by mouth every morning 1/24/2023 Yes Reported, Patient     calcium citrate and vitamin D (CITRACAL) 200-250 MG-UNIT TABS per tablet Take 1 tablet by mouth every morning  Yes Reported, Patient     Coenzyme Q10 (COQ10) 100 MG CAPS Take 100 mg by mouth every morning  1/20/2023 Yes Reported, Patient     cycloSPORINE (RESTASIS) 0.05 % ophthalmic emulsion Apply 1 drop to eye 2 times daily   Yes Reported, Patient     diltiazem ER (DILT-XR) 120 MG 24 hr capsule Take 120 mg by mouth daily  Yes Unknown, Entered By History Yes    fluticasone (FLONASE) 50 MCG/ACT nasal spray Spray 1 spray into both nostrils 2 times daily  Yes Reported, Patient     folic acid (FOLVITE) 1 MG tablet Take 1 mg by mouth every morning  1/20/2023 Yes Reported, Patient     furosemide (LASIX) 20 MG tablet Take 40 mg by mouth every morning  Yes Reported, Patient Yes    gemfibrozil (LOPID) 600 MG tablet Take 600 mg by mouth 2 times daily (before meals)   Yes Reported, Patient Yes    lovastatin (MEVACOR) 20 MG tablet Take 40 mg by mouth At Bedtime  Yes Reported, Patient Yes    magnesium oxide (MAG-OX) 400 MG tablet Take 800 mg by mouth every evening (400MG X 2 = 800MG) 1/24/2023 Yes Reported, Patient     metoprolol succinate ER (TOPROL-XL) 200 MG 24 hr tablet Take 200 mg by mouth every morning  Yes Reported, Patient Yes    multivitamin w/minerals (THERA-VIT-M) tablet Take 1 tablet by mouth every morning  1/20/2023 Yes Reported, Patient      nystatin (MYCOSTATIN) 438736 UNIT/GM external powder Apply topically daily as needed  Yes Reported, Patient     omega 3 1000 MG CAPS Take 1 capsule by mouth 2 times daily  1/20/2023 Yes Reported, Patient     polyethylene glycol (MIRALAX/GLYCOLAX) powder Take 1 capful by mouth every morning   Yes Reported, Patient     potassium chloride ER (K-TAB) 20 MEQ CR tablet Take 20 mEq by mouth 2 times daily  Yes Reported, Patient     sulfaSALAzine (AZULFIDINE) 500 MG tablet Take 1,000 mg by mouth 3 times daily  Yes Reported, Patient     Turmeric 400 MG CAPS Take 400 mg by mouth every morning  1/20/2023 Yes Reported, Patient     order for DME Equipment being ordered: Walker Wheels () and Walker ()  Treatment Diagnosis: Difficulty walking   Cuca Bee MD

## 2023-02-03 ENCOUNTER — ANESTHESIA (OUTPATIENT)
Dept: SURGERY | Facility: CLINIC | Age: 78
DRG: 455 | End: 2023-02-03
Payer: MEDICARE

## 2023-02-03 ENCOUNTER — HOSPITAL ENCOUNTER (INPATIENT)
Facility: CLINIC | Age: 78
LOS: 1 days | Discharge: HOME | DRG: 455 | End: 2023-02-04
Attending: ORTHOPAEDIC SURGERY | Admitting: ORTHOPAEDIC SURGERY
Payer: MEDICARE

## 2023-02-03 ENCOUNTER — APPOINTMENT (OUTPATIENT)
Dept: GENERAL RADIOLOGY | Facility: CLINIC | Age: 78
DRG: 455 | End: 2023-02-03
Attending: ORTHOPAEDIC SURGERY
Payer: MEDICARE

## 2023-02-03 ENCOUNTER — ANESTHESIA EVENT (OUTPATIENT)
Dept: SURGERY | Facility: CLINIC | Age: 78
DRG: 455 | End: 2023-02-03
Payer: MEDICARE

## 2023-02-03 DIAGNOSIS — M43.17 SPONDYLOLISTHESIS OF LUMBOSACRAL REGION: ICD-10-CM

## 2023-02-03 DIAGNOSIS — G89.18 POST-OP PAIN: Primary | ICD-10-CM

## 2023-02-03 PROBLEM — M43.10 SPONDYLOLISTHESIS: Status: ACTIVE | Noted: 2023-02-03

## 2023-02-03 LAB
ABO/RH(D): NORMAL
ANTIBODY SCREEN: NEGATIVE
HGB BLD-MCNC: 12.7 G/DL (ref 11.7–15.7)
SPECIMEN EXPIRATION DATE: NORMAL

## 2023-02-03 PROCEDURE — 86901 BLOOD TYPING SEROLOGIC RH(D): CPT | Performed by: ORTHOPAEDIC SURGERY

## 2023-02-03 PROCEDURE — 120N000001 HC R&B MED SURG/OB

## 2023-02-03 PROCEDURE — 4A11X4G MONITORING OF PERIPHERAL NERVOUS ELECTRICAL ACTIVITY, INTRAOPERATIVE, EXTERNAL APPROACH: ICD-10-PCS | Performed by: ORTHOPAEDIC SURGERY

## 2023-02-03 PROCEDURE — 0SG3071 FUSION OF LUMBOSACRAL JOINT WITH AUTOLOGOUS TISSUE SUBSTITUTE, POSTERIOR APPROACH, POSTERIOR COLUMN, OPEN APPROACH: ICD-10-PCS | Performed by: ORTHOPAEDIC SURGERY

## 2023-02-03 PROCEDURE — 250N000011 HC RX IP 250 OP 636: Performed by: ANESTHESIOLOGY

## 2023-02-03 PROCEDURE — 250N000009 HC RX 250: Performed by: ORTHOPAEDIC SURGERY

## 2023-02-03 PROCEDURE — 250N000025 HC SEVOFLURANE, PER MIN: Performed by: ORTHOPAEDIC SURGERY

## 2023-02-03 PROCEDURE — 370N000017 HC ANESTHESIA TECHNICAL FEE, PER MIN: Performed by: ORTHOPAEDIC SURGERY

## 2023-02-03 PROCEDURE — 258N000003 HC RX IP 258 OP 636: Performed by: NURSE ANESTHETIST, CERTIFIED REGISTERED

## 2023-02-03 PROCEDURE — 36415 COLL VENOUS BLD VENIPUNCTURE: CPT | Performed by: ORTHOPAEDIC SURGERY

## 2023-02-03 PROCEDURE — 272N000002 HC OR SUPPLY OTHER OPNP: Performed by: ORTHOPAEDIC SURGERY

## 2023-02-03 PROCEDURE — 250N000011 HC RX IP 250 OP 636: Performed by: NURSE ANESTHETIST, CERTIFIED REGISTERED

## 2023-02-03 PROCEDURE — 999N000141 HC STATISTIC PRE-PROCEDURE NURSING ASSESSMENT: Performed by: ORTHOPAEDIC SURGERY

## 2023-02-03 PROCEDURE — 999N000179 XR SURGERY CARM FLUORO LESS THAN 5 MIN W STILLS: Mod: TC

## 2023-02-03 PROCEDURE — 86850 RBC ANTIBODY SCREEN: CPT | Performed by: ORTHOPAEDIC SURGERY

## 2023-02-03 PROCEDURE — C1713 ANCHOR/SCREW BN/BN,TIS/BN: HCPCS | Performed by: ORTHOPAEDIC SURGERY

## 2023-02-03 PROCEDURE — 258N000003 HC RX IP 258 OP 636: Performed by: ANESTHESIOLOGY

## 2023-02-03 PROCEDURE — 250N000009 HC RX 250: Performed by: NURSE ANESTHETIST, CERTIFIED REGISTERED

## 2023-02-03 PROCEDURE — 258N000003 HC RX IP 258 OP 636: Performed by: ORTHOPAEDIC SURGERY

## 2023-02-03 PROCEDURE — 01NB0ZZ RELEASE LUMBAR NERVE, OPEN APPROACH: ICD-10-PCS | Performed by: ORTHOPAEDIC SURGERY

## 2023-02-03 PROCEDURE — 710N000009 HC RECOVERY PHASE 1, LEVEL 1, PER MIN: Performed by: ORTHOPAEDIC SURGERY

## 2023-02-03 PROCEDURE — 0SG30A0 FUSION OF LUMBOSACRAL JOINT WITH INTERBODY FUSION DEVICE, ANTERIOR APPROACH, ANTERIOR COLUMN, OPEN APPROACH: ICD-10-PCS | Performed by: ORTHOPAEDIC SURGERY

## 2023-02-03 PROCEDURE — 250N000011 HC RX IP 250 OP 636: Performed by: ORTHOPAEDIC SURGERY

## 2023-02-03 PROCEDURE — 0SB40ZZ EXCISION OF LUMBOSACRAL DISC, OPEN APPROACH: ICD-10-PCS | Performed by: ORTHOPAEDIC SURGERY

## 2023-02-03 PROCEDURE — 07DR3ZZ EXTRACTION OF ILIAC BONE MARROW, PERCUTANEOUS APPROACH: ICD-10-PCS | Performed by: ORTHOPAEDIC SURGERY

## 2023-02-03 PROCEDURE — 272N000001 HC OR GENERAL SUPPLY STERILE: Performed by: ORTHOPAEDIC SURGERY

## 2023-02-03 PROCEDURE — 85018 HEMOGLOBIN: CPT | Performed by: ORTHOPAEDIC SURGERY

## 2023-02-03 PROCEDURE — 250N000013 HC RX MED GY IP 250 OP 250 PS 637: Performed by: ORTHOPAEDIC SURGERY

## 2023-02-03 PROCEDURE — 272N000282 HC OR IOM SUPPLIES OPNP: Performed by: ORTHOPAEDIC SURGERY

## 2023-02-03 PROCEDURE — 360N000084 HC SURGERY LEVEL 4 W/ FLUORO, PER MIN: Performed by: ORTHOPAEDIC SURGERY

## 2023-02-03 DEVICE — IMPLANTABLE DEVICE: Type: IMPLANTABLE DEVICE | Site: SPINE LUMBAR | Status: FUNCTIONAL

## 2023-02-03 DEVICE — SCREW SET SPNE STAR OPN LNK PATHLOC-L STRL LF: Type: IMPLANTABLE DEVICE | Site: SPINE LUMBAR | Status: FUNCTIONAL

## 2023-02-03 DEVICE — SCREW BN 50MM 7.5MM ST CLS 2 THRD 127MM NS PATHLOC-L SPNE LF: Type: IMPLANTABLE DEVICE | Site: SPINE LUMBAR | Status: FUNCTIONAL

## 2023-02-03 RX ORDER — AMOXICILLIN 250 MG
1 CAPSULE ORAL 2 TIMES DAILY
Status: DISCONTINUED | OUTPATIENT
Start: 2023-02-03 | End: 2023-02-04 | Stop reason: HOSPADM

## 2023-02-03 RX ORDER — NALOXONE HYDROCHLORIDE 0.4 MG/ML
0.4 INJECTION, SOLUTION INTRAMUSCULAR; INTRAVENOUS; SUBCUTANEOUS
Status: DISCONTINUED | OUTPATIENT
Start: 2023-02-03 | End: 2023-02-04 | Stop reason: HOSPADM

## 2023-02-03 RX ORDER — LABETALOL HYDROCHLORIDE 5 MG/ML
10 INJECTION, SOLUTION INTRAVENOUS
Status: DISCONTINUED | OUTPATIENT
Start: 2023-02-03 | End: 2023-02-03 | Stop reason: HOSPADM

## 2023-02-03 RX ORDER — LIDOCAINE HYDROCHLORIDE 10 MG/ML
INJECTION, SOLUTION INFILTRATION; PERINEURAL PRN
Status: DISCONTINUED | OUTPATIENT
Start: 2023-02-03 | End: 2023-02-03

## 2023-02-03 RX ORDER — GABAPENTIN 100 MG/1
100 CAPSULE ORAL AT BEDTIME
Status: DISCONTINUED | OUTPATIENT
Start: 2023-02-03 | End: 2023-02-04 | Stop reason: HOSPADM

## 2023-02-03 RX ORDER — OXYCODONE HYDROCHLORIDE 10 MG/1
10 TABLET ORAL EVERY 4 HOURS PRN
Status: DISCONTINUED | OUTPATIENT
Start: 2023-02-03 | End: 2023-02-04 | Stop reason: HOSPADM

## 2023-02-03 RX ORDER — HYDROMORPHONE HCL IN WATER/PF 6 MG/30 ML
0.2 PATIENT CONTROLLED ANALGESIA SYRINGE INTRAVENOUS
Status: DISCONTINUED | OUTPATIENT
Start: 2023-02-03 | End: 2023-02-04 | Stop reason: HOSPADM

## 2023-02-03 RX ORDER — DEXAMETHASONE SODIUM PHOSPHATE 4 MG/ML
INJECTION, SOLUTION INTRA-ARTICULAR; INTRALESIONAL; INTRAMUSCULAR; INTRAVENOUS; SOFT TISSUE PRN
Status: DISCONTINUED | OUTPATIENT
Start: 2023-02-03 | End: 2023-02-03

## 2023-02-03 RX ORDER — GEMFIBROZIL 600 MG/1
600 TABLET, FILM COATED ORAL
Status: DISCONTINUED | OUTPATIENT
Start: 2023-02-03 | End: 2023-02-04 | Stop reason: HOSPADM

## 2023-02-03 RX ORDER — HYDROMORPHONE HCL IN WATER/PF 6 MG/30 ML
0.4 PATIENT CONTROLLED ANALGESIA SYRINGE INTRAVENOUS
Status: DISCONTINUED | OUTPATIENT
Start: 2023-02-03 | End: 2023-02-04 | Stop reason: HOSPADM

## 2023-02-03 RX ORDER — FENTANYL CITRATE 50 UG/ML
50 INJECTION, SOLUTION INTRAMUSCULAR; INTRAVENOUS EVERY 5 MIN PRN
Status: DISCONTINUED | OUTPATIENT
Start: 2023-02-03 | End: 2023-02-03 | Stop reason: HOSPADM

## 2023-02-03 RX ORDER — ACETAMINOPHEN 325 MG/1
975 TABLET ORAL EVERY 8 HOURS
Status: DISCONTINUED | OUTPATIENT
Start: 2023-02-03 | End: 2023-02-04 | Stop reason: HOSPADM

## 2023-02-03 RX ORDER — FENTANYL CITRATE 50 UG/ML
INJECTION, SOLUTION INTRAMUSCULAR; INTRAVENOUS PRN
Status: DISCONTINUED | OUTPATIENT
Start: 2023-02-03 | End: 2023-02-03

## 2023-02-03 RX ORDER — CYCLOSPORINE 0.5 MG/ML
1 EMULSION OPHTHALMIC 2 TIMES DAILY
Status: DISCONTINUED | OUTPATIENT
Start: 2023-02-03 | End: 2023-02-04 | Stop reason: HOSPADM

## 2023-02-03 RX ORDER — BISACODYL 10 MG
10 SUPPOSITORY, RECTAL RECTAL DAILY PRN
Status: DISCONTINUED | OUTPATIENT
Start: 2023-02-03 | End: 2023-02-04 | Stop reason: HOSPADM

## 2023-02-03 RX ORDER — PROPOFOL 10 MG/ML
INJECTION, EMULSION INTRAVENOUS PRN
Status: DISCONTINUED | OUTPATIENT
Start: 2023-02-03 | End: 2023-02-03

## 2023-02-03 RX ORDER — SODIUM CHLORIDE 9 MG/ML
INJECTION, SOLUTION INTRAVENOUS CONTINUOUS
Status: DISCONTINUED | OUTPATIENT
Start: 2023-02-03 | End: 2023-02-04 | Stop reason: HOSPADM

## 2023-02-03 RX ORDER — BUPIVACAINE HYDROCHLORIDE 2.5 MG/ML
INJECTION, SOLUTION EPIDURAL; INFILTRATION; INTRACAUDAL PRN
Status: DISCONTINUED | OUTPATIENT
Start: 2023-02-03 | End: 2023-02-03 | Stop reason: HOSPADM

## 2023-02-03 RX ORDER — FENTANYL CITRATE 50 UG/ML
25 INJECTION, SOLUTION INTRAMUSCULAR; INTRAVENOUS EVERY 5 MIN PRN
Status: DISCONTINUED | OUTPATIENT
Start: 2023-02-03 | End: 2023-02-03 | Stop reason: HOSPADM

## 2023-02-03 RX ORDER — FLUTICASONE PROPIONATE 50 MCG
1 SPRAY, SUSPENSION (ML) NASAL 2 TIMES DAILY
Status: DISCONTINUED | OUTPATIENT
Start: 2023-02-03 | End: 2023-02-04 | Stop reason: HOSPADM

## 2023-02-03 RX ORDER — POTASSIUM CHLORIDE 750 MG/1
20 TABLET, EXTENDED RELEASE ORAL 2 TIMES DAILY
Status: DISCONTINUED | OUTPATIENT
Start: 2023-02-03 | End: 2023-02-04 | Stop reason: HOSPADM

## 2023-02-03 RX ORDER — KETOROLAC TROMETHAMINE 15 MG/ML
15 INJECTION, SOLUTION INTRAMUSCULAR; INTRAVENOUS EVERY 6 HOURS
Status: COMPLETED | OUTPATIENT
Start: 2023-02-03 | End: 2023-02-04

## 2023-02-03 RX ORDER — DILTIAZEM HYDROCHLORIDE 120 MG/1
120 CAPSULE, COATED, EXTENDED RELEASE ORAL DAILY
Status: DISCONTINUED | OUTPATIENT
Start: 2023-02-04 | End: 2023-02-04 | Stop reason: HOSPADM

## 2023-02-03 RX ORDER — HYDROMORPHONE HCL IN WATER/PF 6 MG/30 ML
0.2 PATIENT CONTROLLED ANALGESIA SYRINGE INTRAVENOUS EVERY 5 MIN PRN
Status: DISCONTINUED | OUTPATIENT
Start: 2023-02-03 | End: 2023-02-03 | Stop reason: HOSPADM

## 2023-02-03 RX ORDER — NALOXONE HYDROCHLORIDE 0.4 MG/ML
0.2 INJECTION, SOLUTION INTRAMUSCULAR; INTRAVENOUS; SUBCUTANEOUS
Status: DISCONTINUED | OUTPATIENT
Start: 2023-02-03 | End: 2023-02-04 | Stop reason: HOSPADM

## 2023-02-03 RX ORDER — KETOROLAC TROMETHAMINE 30 MG/ML
INJECTION, SOLUTION INTRAMUSCULAR; INTRAVENOUS PRN
Status: DISCONTINUED | OUTPATIENT
Start: 2023-02-03 | End: 2023-02-03

## 2023-02-03 RX ORDER — TRANEXAMIC ACID 10 MG/ML
1000 INJECTION, SOLUTION INTRAVENOUS ONCE
Status: COMPLETED | OUTPATIENT
Start: 2023-02-03 | End: 2023-02-03

## 2023-02-03 RX ORDER — SODIUM CHLORIDE, SODIUM LACTATE, POTASSIUM CHLORIDE, CALCIUM CHLORIDE 600; 310; 30; 20 MG/100ML; MG/100ML; MG/100ML; MG/100ML
INJECTION, SOLUTION INTRAVENOUS CONTINUOUS
Status: DISCONTINUED | OUTPATIENT
Start: 2023-02-03 | End: 2023-02-03 | Stop reason: HOSPADM

## 2023-02-03 RX ORDER — ONDANSETRON 2 MG/ML
4 INJECTION INTRAMUSCULAR; INTRAVENOUS EVERY 30 MIN PRN
Status: DISCONTINUED | OUTPATIENT
Start: 2023-02-03 | End: 2023-02-03 | Stop reason: HOSPADM

## 2023-02-03 RX ORDER — LIDOCAINE 40 MG/G
CREAM TOPICAL
Status: DISCONTINUED | OUTPATIENT
Start: 2023-02-03 | End: 2023-02-03 | Stop reason: HOSPADM

## 2023-02-03 RX ORDER — LABETALOL 20 MG/4 ML (5 MG/ML) INTRAVENOUS SYRINGE
PRN
Status: DISCONTINUED | OUTPATIENT
Start: 2023-02-03 | End: 2023-02-03

## 2023-02-03 RX ORDER — PRAVASTATIN SODIUM 20 MG
20 TABLET ORAL AT BEDTIME
Status: DISCONTINUED | OUTPATIENT
Start: 2023-02-03 | End: 2023-02-04 | Stop reason: HOSPADM

## 2023-02-03 RX ORDER — FOLIC ACID 1 MG/1
1 TABLET ORAL EVERY MORNING
Status: DISCONTINUED | OUTPATIENT
Start: 2023-02-04 | End: 2023-02-04 | Stop reason: HOSPADM

## 2023-02-03 RX ORDER — PROCHLORPERAZINE MALEATE 5 MG
5 TABLET ORAL EVERY 6 HOURS PRN
Status: DISCONTINUED | OUTPATIENT
Start: 2023-02-03 | End: 2023-02-04 | Stop reason: HOSPADM

## 2023-02-03 RX ORDER — ONDANSETRON 4 MG/1
4 TABLET, ORALLY DISINTEGRATING ORAL EVERY 6 HOURS PRN
Status: DISCONTINUED | OUTPATIENT
Start: 2023-02-03 | End: 2023-02-04 | Stop reason: HOSPADM

## 2023-02-03 RX ORDER — ACETAMINOPHEN 325 MG/1
650 TABLET ORAL EVERY 4 HOURS PRN
Status: DISCONTINUED | OUTPATIENT
Start: 2023-02-06 | End: 2023-02-04 | Stop reason: HOSPADM

## 2023-02-03 RX ORDER — ONDANSETRON 2 MG/ML
INJECTION INTRAMUSCULAR; INTRAVENOUS PRN
Status: DISCONTINUED | OUTPATIENT
Start: 2023-02-03 | End: 2023-02-03

## 2023-02-03 RX ORDER — OXYCODONE HYDROCHLORIDE 5 MG/1
10 TABLET ORAL EVERY 4 HOURS PRN
Status: DISCONTINUED | OUTPATIENT
Start: 2023-02-03 | End: 2023-02-03 | Stop reason: HOSPADM

## 2023-02-03 RX ORDER — CEFAZOLIN SODIUM/WATER 2 G/20 ML
2 SYRINGE (ML) INTRAVENOUS
Status: COMPLETED | OUTPATIENT
Start: 2023-02-03 | End: 2023-02-03

## 2023-02-03 RX ORDER — OXYCODONE HYDROCHLORIDE 5 MG/1
5 TABLET ORAL EVERY 4 HOURS PRN
Status: DISCONTINUED | OUTPATIENT
Start: 2023-02-03 | End: 2023-02-03 | Stop reason: HOSPADM

## 2023-02-03 RX ORDER — METOPROLOL SUCCINATE 100 MG/1
200 TABLET, EXTENDED RELEASE ORAL EVERY MORNING
Status: DISCONTINUED | OUTPATIENT
Start: 2023-02-04 | End: 2023-02-04 | Stop reason: HOSPADM

## 2023-02-03 RX ORDER — LIDOCAINE 40 MG/G
CREAM TOPICAL
Status: DISCONTINUED | OUTPATIENT
Start: 2023-02-03 | End: 2023-02-04 | Stop reason: HOSPADM

## 2023-02-03 RX ORDER — POLYETHYLENE GLYCOL 3350 17 G/17G
17 POWDER, FOR SOLUTION ORAL DAILY
Status: DISCONTINUED | OUTPATIENT
Start: 2023-02-04 | End: 2023-02-04 | Stop reason: HOSPADM

## 2023-02-03 RX ORDER — TRANEXAMIC ACID 10 MG/ML
5 INJECTION, SOLUTION INTRAVENOUS CONTINUOUS
Status: DISCONTINUED | OUTPATIENT
Start: 2023-02-03 | End: 2023-02-03 | Stop reason: HOSPADM

## 2023-02-03 RX ORDER — GABAPENTIN 100 MG/1
100 CAPSULE ORAL
Status: COMPLETED | OUTPATIENT
Start: 2023-02-03 | End: 2023-02-03

## 2023-02-03 RX ORDER — ASPIRIN 81 MG/1
81 TABLET ORAL EVERY MORNING
Status: DISCONTINUED | OUTPATIENT
Start: 2023-02-04 | End: 2023-02-04 | Stop reason: HOSPADM

## 2023-02-03 RX ORDER — SODIUM CHLORIDE, SODIUM LACTATE, POTASSIUM CHLORIDE, CALCIUM CHLORIDE 600; 310; 30; 20 MG/100ML; MG/100ML; MG/100ML; MG/100ML
INJECTION, SOLUTION INTRAVENOUS CONTINUOUS PRN
Status: DISCONTINUED | OUTPATIENT
Start: 2023-02-03 | End: 2023-02-03

## 2023-02-03 RX ORDER — PROPOFOL 10 MG/ML
INJECTION, EMULSION INTRAVENOUS CONTINUOUS PRN
Status: DISCONTINUED | OUTPATIENT
Start: 2023-02-03 | End: 2023-02-03

## 2023-02-03 RX ORDER — FUROSEMIDE 40 MG
40 TABLET ORAL EVERY MORNING
Status: DISCONTINUED | OUTPATIENT
Start: 2023-02-04 | End: 2023-02-04 | Stop reason: HOSPADM

## 2023-02-03 RX ORDER — ONDANSETRON 4 MG/1
4 TABLET, ORALLY DISINTEGRATING ORAL EVERY 30 MIN PRN
Status: DISCONTINUED | OUTPATIENT
Start: 2023-02-03 | End: 2023-02-03 | Stop reason: HOSPADM

## 2023-02-03 RX ORDER — CEFAZOLIN SODIUM/WATER 2 G/20 ML
2 SYRINGE (ML) INTRAVENOUS SEE ADMIN INSTRUCTIONS
Status: DISCONTINUED | OUTPATIENT
Start: 2023-02-03 | End: 2023-02-03 | Stop reason: HOSPADM

## 2023-02-03 RX ORDER — ONDANSETRON 2 MG/ML
4 INJECTION INTRAMUSCULAR; INTRAVENOUS EVERY 6 HOURS PRN
Status: DISCONTINUED | OUTPATIENT
Start: 2023-02-03 | End: 2023-02-04 | Stop reason: HOSPADM

## 2023-02-03 RX ORDER — SULFASALAZINE 500 MG/1
1000 TABLET ORAL 3 TIMES DAILY
Status: DISCONTINUED | OUTPATIENT
Start: 2023-02-03 | End: 2023-02-04 | Stop reason: HOSPADM

## 2023-02-03 RX ORDER — POLYETHYLENE GLYCOL 3350 17 G/17G
17 POWDER, FOR SOLUTION ORAL EVERY MORNING
Status: DISCONTINUED | OUTPATIENT
Start: 2023-02-04 | End: 2023-02-03

## 2023-02-03 RX ORDER — OXYCODONE HYDROCHLORIDE 5 MG/1
5 TABLET ORAL EVERY 4 HOURS PRN
Status: DISCONTINUED | OUTPATIENT
Start: 2023-02-03 | End: 2023-02-04 | Stop reason: HOSPADM

## 2023-02-03 RX ORDER — HYDROMORPHONE HCL IN WATER/PF 6 MG/30 ML
0.4 PATIENT CONTROLLED ANALGESIA SYRINGE INTRAVENOUS EVERY 5 MIN PRN
Status: DISCONTINUED | OUTPATIENT
Start: 2023-02-03 | End: 2023-02-03 | Stop reason: HOSPADM

## 2023-02-03 RX ADMIN — GABAPENTIN 100 MG: 100 CAPSULE ORAL at 22:40

## 2023-02-03 RX ADMIN — PROPOFOL 140 MG: 10 INJECTION, EMULSION INTRAVENOUS at 11:09

## 2023-02-03 RX ADMIN — ACETAMINOPHEN 975 MG: 325 TABLET ORAL at 18:20

## 2023-02-03 RX ADMIN — OXYCODONE HYDROCHLORIDE 5 MG: 5 TABLET ORAL at 22:40

## 2023-02-03 RX ADMIN — OXYCODONE HYDROCHLORIDE 5 MG: 5 TABLET ORAL at 18:34

## 2023-02-03 RX ADMIN — HYDROMORPHONE HYDROCHLORIDE 0.2 MG: 0.2 INJECTION, SOLUTION INTRAMUSCULAR; INTRAVENOUS; SUBCUTANEOUS at 14:16

## 2023-02-03 RX ADMIN — PROPOFOL 150 MCG/KG/MIN: 10 INJECTION, EMULSION INTRAVENOUS at 11:20

## 2023-02-03 RX ADMIN — SODIUM CHLORIDE, POTASSIUM CHLORIDE, SODIUM LACTATE AND CALCIUM CHLORIDE: 600; 310; 30; 20 INJECTION, SOLUTION INTRAVENOUS at 13:06

## 2023-02-03 RX ADMIN — POTASSIUM CHLORIDE 20 MEQ: 750 TABLET, FILM COATED, EXTENDED RELEASE ORAL at 20:22

## 2023-02-03 RX ADMIN — HYDROMORPHONE HYDROCHLORIDE 0.5 MG: 1 INJECTION, SOLUTION INTRAMUSCULAR; INTRAVENOUS; SUBCUTANEOUS at 13:13

## 2023-02-03 RX ADMIN — FENTANYL CITRATE 50 MCG: 50 INJECTION, SOLUTION INTRAMUSCULAR; INTRAVENOUS at 14:09

## 2023-02-03 RX ADMIN — PRAVASTATIN SODIUM 20 MG: 20 TABLET ORAL at 22:39

## 2023-02-03 RX ADMIN — TRANEXAMIC ACID 1000 MG: 10 INJECTION, SOLUTION INTRAVENOUS at 11:25

## 2023-02-03 RX ADMIN — SULFASALAZINE 1000 MG: 500 TABLET ORAL at 21:04

## 2023-02-03 RX ADMIN — SENNOSIDES AND DOCUSATE SODIUM 1 TABLET: 50; 8.6 TABLET ORAL at 20:22

## 2023-02-03 RX ADMIN — SODIUM CHLORIDE: 9 INJECTION, SOLUTION INTRAVENOUS at 18:30

## 2023-02-03 RX ADMIN — DEXAMETHASONE SODIUM PHOSPHATE 8 MG: 4 INJECTION, SOLUTION INTRA-ARTICULAR; INTRALESIONAL; INTRAMUSCULAR; INTRAVENOUS; SOFT TISSUE at 11:09

## 2023-02-03 RX ADMIN — KETOROLAC TROMETHAMINE 30 MG: 30 INJECTION, SOLUTION INTRAMUSCULAR at 12:53

## 2023-02-03 RX ADMIN — GABAPENTIN 100 MG: 100 CAPSULE ORAL at 08:43

## 2023-02-03 RX ADMIN — MIDAZOLAM 2 MG: 1 INJECTION INTRAMUSCULAR; INTRAVENOUS at 11:03

## 2023-02-03 RX ADMIN — FENTANYL CITRATE 50 MCG: 50 INJECTION, SOLUTION INTRAMUSCULAR; INTRAVENOUS at 13:51

## 2023-02-03 RX ADMIN — KETOROLAC TROMETHAMINE 15 MG: 15 INJECTION, SOLUTION INTRAMUSCULAR; INTRAVENOUS at 18:19

## 2023-02-03 RX ADMIN — FENTANYL CITRATE 100 MCG: 50 INJECTION, SOLUTION INTRAMUSCULAR; INTRAVENOUS at 11:09

## 2023-02-03 RX ADMIN — LIDOCAINE HYDROCHLORIDE 50 MG: 10 INJECTION, SOLUTION INFILTRATION; PERINEURAL at 11:09

## 2023-02-03 RX ADMIN — FENTANYL CITRATE 50 MCG: 50 INJECTION, SOLUTION INTRAMUSCULAR; INTRAVENOUS at 13:42

## 2023-02-03 RX ADMIN — SODIUM CHLORIDE, POTASSIUM CHLORIDE, SODIUM LACTATE AND CALCIUM CHLORIDE: 600; 310; 30; 20 INJECTION, SOLUTION INTRAVENOUS at 11:03

## 2023-02-03 RX ADMIN — CYCLOSPORINE 1 DROP: 0.5 EMULSION OPHTHALMIC at 20:23

## 2023-02-03 RX ADMIN — LABETALOL 20 MG/4 ML (5 MG/ML) INTRAVENOUS SYRINGE 5 MG: at 13:34

## 2023-02-03 RX ADMIN — PROPOFOL 40 MG: 10 INJECTION, EMULSION INTRAVENOUS at 11:19

## 2023-02-03 RX ADMIN — FENTANYL CITRATE 50 MCG: 50 INJECTION, SOLUTION INTRAMUSCULAR; INTRAVENOUS at 12:36

## 2023-02-03 RX ADMIN — ONDANSETRON 4 MG: 2 INJECTION INTRAMUSCULAR; INTRAVENOUS at 12:53

## 2023-02-03 RX ADMIN — SODIUM CHLORIDE, POTASSIUM CHLORIDE, SODIUM LACTATE AND CALCIUM CHLORIDE: 600; 310; 30; 20 INJECTION, SOLUTION INTRAVENOUS at 09:26

## 2023-02-03 RX ADMIN — GEMFIBROZIL 600 MG: 600 TABLET ORAL at 21:05

## 2023-02-03 RX ADMIN — Medication 100 MG: at 11:09

## 2023-02-03 RX ADMIN — HYDROMORPHONE HYDROCHLORIDE 0.5 MG: 1 INJECTION, SOLUTION INTRAMUSCULAR; INTRAVENOUS; SUBCUTANEOUS at 13:42

## 2023-02-03 RX ADMIN — Medication 2 G: at 11:03

## 2023-02-03 RX ADMIN — FLUTICASONE PROPIONATE 1 SPRAY: 50 SPRAY, METERED NASAL at 21:03

## 2023-02-03 ASSESSMENT — ACTIVITIES OF DAILY LIVING (ADL)
ADLS_ACUITY_SCORE: 23
ADLS_ACUITY_SCORE: 22
ADLS_ACUITY_SCORE: 23

## 2023-02-03 ASSESSMENT — LIFESTYLE VARIABLES: TOBACCO_USE: 0

## 2023-02-03 NOTE — OP NOTE
PRE-PROCEDURE DIAGNOSIS:  1) L5 to S1  degenerative  30% sondylolisthesis and spinal stenosis with neurogenic claudication     POST-PROCEDURE DIAGNOSIS:  1) Same as above  PROCEDURE PERFORMED:  1) L5S1 oblique lateral lumbar interbody fusion with discectomy, preparation of the endplate and placement of a bullet cage packed with calcium triphosphate soaked in bone marrow anterior to the transverse process in modified prone position, with intraoperative biplanar fluoroscopic imaging and electrophysiological monitoring.also supplementation with small dose INFUSE kit packed in the disc space and inside the cage  2) L5 S1 Posterior minimally invasive pedicle screw placement and posterolateral instrumentation and fusion with lnk system with intraoperative biplanar fluoroscopic imaging and electrophysiological monitoring.  3) Transpedicular Bone marrow aspiration  4)  The procedure was very difficult requiring extra 50% of normal operating time due to severe spondylolisthesis.     EBL: 50cc        Surgeon: Venkatesh Merrill PA-C     1st assistant needed for patient positioning wound closure and assistance with instrumentation for this complex spine surgery.            HISTORY: Please refer to my clinic note for full details, but in short, patient is a 77 -year-old  female  with above diagnosis not responding to usual conservative therapy. Patient was set up for the surgery as mentioned above and was taken to surgery as mentioned above after all risks and benefits were explained.     PROCEDURE:  The patient was taken to surgery. After general anesthesia was applied, SCDs and Hollins placed and preoperative antibiotic given, then patient has been positioned on the Manjinder table and Russell frame in a modified prone position for ease of access from the left side.  Hollins catheter was placed by a urologist due to his urethral stricture requiring dilatation via cystoscope.      AP and lateral fluoroscopic images are  positioned. Patient has been prepped and draped in sterile fashion. The landmarks, including Spinal process, transverse process, disk space, endplates and pedicels are identified and marked.  A Jamshidi needle is placed in upper right pedicle inside of the vertebral body and bone marrow has been aspirated to be mixed with biologics to introduce Stem cells to the biologics.  Following steps are then taken for levels:  L5 S1    Cage size 12mm high and 27 mm long Titaium packed with bone marrow soaded calcium TPP and small dose INFUSE kit due to her smoking history.      Cages went in via the Kambin's triangle at EMG silent window greater than 4 Ma.      The patient was turned using the rotation of the surgical table so that a near direct anterior-lateral approach to the lumbar spine could be achieved. A small incision was then made superior to the mid iliac crest and then using biplanar fluoroscopic visualization, under electrophysiological monitoring and stimulation, we introduced an electrophysiological probe through the retro peritoneal space into the desired discs anterior to the transverse processes and then passed it into the disc space after finding a silent window. The sleeve is retained and the probe is removed, then a K wire was passed sequentially into the disk space. A dilating tube was then passed along this same route. Following this, a working channel was then passed sequentially into the disc spaces. The working channel was manually held in position while a series of disc cleaning tools was passed through the channel to remove the affected discs under clear and direct biplanar fluoroscopic visualization, decompress the nerve roots, and decorticate the vertebral endplates at those segments.  Arthrodesis of the intervertebral spaces via an anterior retroperitoneal exposure and application of an intervertebral biomechanical device was then accomplished by using the working channel that had been placed in  the retroperitoneal space anterior to the transverse processes. After adequate decompression and preparation of the endplates, we then put calcium triphosphate soaked in bone marrow anterior into disc space and the working channel was then removed after a K-Wire was reinserted. Then a interbody was packed tightly with allograft bone for stabilization and arthrodesis of the intervertebral spaces and inserted into the mid portion of the intervertebral discs. This was done under biplanar fluoroscopic visualization. All bone was confined to the borders of the disc space.   The disc space and cage was packed with both small dose INFUSE and bone marrow soaked tricalcium phosphate.   Posterior facets and pars area were also packed with the same after decortication with minimally invasive device.      Physiological Decompression of foramen, lateral recess and spinal canal is achieved by restoring the anatomical distance of inter discal space and increasing inter pedicular distance with interbody graft.     Following steps are then taken for levels:      L5  7.5mm Screw size Right 60 Left 60  S1  7.5mm Screw size Right 50 and Left 50        Then patient is rotated for a true prone position. Then entry point for the pedicles is identified in the AP and lateral view, and then skin incision has been injected with local anesthetic. Then we entered the pedicle with a Jamshidi needle. Over the Jamshidi needle, we introduced the K wire in Vertebral body.  Additionally we use a small periosteal elevator along the screws to refresh the surface of the bone and facet and put minimal amount of Calcium-triphosphate soaked in bone marrow for additional posterolateral fusion. Over the K wire then , we dilate the muscle with the dilator and then put a pedicle screws bilaterally. Screws are all silent up to   25 MA of stimulation. After screws are all placed, we put pamela in place and under fluoroscopic imaging, we locked the pamela in place and  removed the screw tops and then each incision has been closed with 2-0 Vicryl suture and  Skin staples.      Final ap and lateral  C arm images showed good positioning of all the hardware     6incisions were made for this case.  The cage went in about 2 cm incision.  Others were 1 cm to 2Cm lengths.               DISPOSITION: To PACU with postoperative antibiotic. All counts are correct at the end of the surgery.     Venkatesh Peguero MD
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
## 2023-02-03 NOTE — PROGRESS NOTES
5 Patient stated that she had not received a brace from [facility] due to insurance issues. She was sent a back brace several months ago without any sort of notification and thought it was a scam so she got rid of the brace.   Insurance states that it won't pay for another one but patient said she is willing to buy one for herself if necessary.     Patient said she has about a 100 mile ride home after discharge.    Krista Gaytan RN on 2/3/2023 at 8:59 AM      Dr. Peguero gave the okay for the patient not to have the back brace prior to surgery. He said that she won't need it until her 1st post op appointment.    Krista Gaytan RN on 2/3/2023 at 10:16 AM

## 2023-02-03 NOTE — BRIEF OP NOTE
[facility] Brief Operative Note    Pre-operative diagnosis: Spondylolisthesis of lumbar region [M43.16]  Pseudoclaudication syndrome [M48.062]   Post-operative diagnosis L5 S1 spondylolisthesis     Procedure: Procedure(s):  Lumbar 5 to sacral 1 oblique lateral lumbar interbody fusion   Surgeon(s): Surgeon(s) and Role:     * Venkatesh Peguero MD - Primary     * Alka Merrill PA-C - Assisting   Estimated blood loss: * No values recorded between 2/3/2023 11:47 AM and 2/3/2023  1:34 PM *    Specimens: * No specimens in log *   Findings: See op note

## 2023-02-03 NOTE — ANESTHESIA PROCEDURE NOTES
Airway       Patient location during procedure: OR       Procedure Start/Stop Times: 2/3/2023 11:13 AM  Staff -        Anesthesiologist:  Silke Sky MD       CRNA: Severson, Dean Dennis, APRN CRNA       Performed By: CRNA  Consent for Airway        Urgency: elective  Indications and Patient Condition       Indications for airway management: maximus-procedural and airway protection       Induction type:intravenous       Mask difficulty assessment: 1 - vent by mask    Final Airway Details       Final airway type: endotracheal airway       Successful airway: ETT - single  Endotracheal Airway Details        ETT size (mm): 7.0       Cuffed: yes       Successful intubation technique: direct laryngoscopy       DL Blade Type: Sweeney 2       Grade View of Cords: 2       Adjucts: stylet       Position: Center       Measured from: lips       Secured at (cm): 22       Bite block used: Soft    Post intubation assessment        Placement verified by: capnometry, equal breath sounds and chest rise        Number of attempts at approach: 1       Number of other approaches attempted: 0       Secured with: plastic tape       Ease of procedure: easy       Dentition: Intact and Unchanged    Medication(s) Administered   Medication Administration Time: 2/3/2023 11:13 AM

## 2023-02-03 NOTE — ANESTHESIA POSTPROCEDURE EVALUATION
Patient: Jany Scherer    Procedure: Procedure(s):  Lumbar 5 to sacral 1 oblique lateral lumbar interbody fusion       Anesthesia Type:  General    Note:  Disposition: Inpatient   Postop Pain Control: Uneventful            Sign Out: Well controlled pain   PONV: No   Neuro/Psych: Uneventful            Sign Out: Acceptable/Baseline neuro status   Airway/Respiratory: Uneventful            Sign Out: Acceptable/Baseline resp. status   CV/Hemodynamics: Uneventful            Sign Out: Acceptable CV status; No obvious hypovolemia; No obvious fluid overload   Other NRE: NONE   DID A NON-ROUTINE EVENT OCCUR? No           Last vitals:  Vitals Value Taken Time   /89 02/03/23 1336   Temp     Pulse 68 02/03/23 1340   Resp 22 02/03/23 1340   SpO2 99 % 02/03/23 1339   Vitals shown include unvalidated device data.    Electronically Signed By: Camron Gipson MD  February 3, 2023  1:42 PM

## 2023-02-03 NOTE — ANESTHESIA CARE TRANSFER NOTE
Patient: Jany Scherer    Procedure: Procedure(s):  Lumbar 5 to sacral 1 oblique lateral lumbar interbody fusion       Diagnosis: Spondylolisthesis of lumbar region [M43.16]  Pseudoclaudication syndrome [M48.062]  Diagnosis Additional Information: No value filed.    Anesthesia Type:   General     Note:    Oropharynx: oropharynx clear of all foreign objects and spontaneously breathing  Level of Consciousness: awake  Oxygen Supplementation: face mask  Level of Supplemental Oxygen (L/min / FiO2): 10  Independent Airway: airway patency satisfactory and stable  Dentition: dentition unchanged  Vital Signs Stable: post-procedure vital signs reviewed and stable  Report to RN Given: handoff report given  Patient transferred to: PACU    Handoff Report: Identifed the Patient, Identified the Reponsible Provider, Reviewed the pertinent medical history, Discussed the surgical course, Reviewed Intra-OP anesthesia mangement and issues during anesthesia, Set expectations for post-procedure period and Allowed opportunity for questions and acknowledgement of understanding      Vitals:  Vitals Value Taken Time   /94 02/03/23 1340   Temp     Pulse 82 02/03/23 1342   Resp 21 02/03/23 1342   SpO2 100 % 02/03/23 1342   Vitals shown include unvalidated device data.    Electronically Signed By: Edith Monroe CRNA, APRN CRNA  February 3, 2023  1:43 PM

## 2023-02-04 ENCOUNTER — APPOINTMENT (OUTPATIENT)
Dept: OCCUPATIONAL THERAPY | Facility: CLINIC | Age: 78
DRG: 455 | End: 2023-02-04
Attending: ORTHOPAEDIC SURGERY
Payer: MEDICARE

## 2023-02-04 ENCOUNTER — APPOINTMENT (OUTPATIENT)
Dept: PHYSICAL THERAPY | Facility: CLINIC | Age: 78
DRG: 455 | End: 2023-02-04
Attending: ORTHOPAEDIC SURGERY
Payer: MEDICARE

## 2023-02-04 VITALS
DIASTOLIC BLOOD PRESSURE: 72 MMHG | SYSTOLIC BLOOD PRESSURE: 154 MMHG | OXYGEN SATURATION: 100 % | RESPIRATION RATE: 16 BRPM | WEIGHT: 232 LBS | BODY MASS INDEX: 38.65 KG/M2 | HEIGHT: 65 IN | HEART RATE: 79 BPM | TEMPERATURE: 97.2 F

## 2023-02-04 LAB
GLUCOSE BLDC GLUCOMTR-MCNC: 116 MG/DL (ref 70–99)
HGB BLD-MCNC: 11.5 G/DL (ref 11.7–15.7)
HOLD SPECIMEN: NORMAL

## 2023-02-04 PROCEDURE — 97116 GAIT TRAINING THERAPY: CPT | Mod: GP

## 2023-02-04 PROCEDURE — 97530 THERAPEUTIC ACTIVITIES: CPT | Mod: GP

## 2023-02-04 PROCEDURE — 250N000013 HC RX MED GY IP 250 OP 250 PS 637: Performed by: ORTHOPAEDIC SURGERY

## 2023-02-04 PROCEDURE — 250N000013 HC RX MED GY IP 250 OP 250 PS 637: Performed by: PHYSICIAN ASSISTANT

## 2023-02-04 PROCEDURE — 97165 OT EVAL LOW COMPLEX 30 MIN: CPT | Mod: GO

## 2023-02-04 PROCEDURE — 36415 COLL VENOUS BLD VENIPUNCTURE: CPT | Performed by: ORTHOPAEDIC SURGERY

## 2023-02-04 PROCEDURE — 85018 HEMOGLOBIN: CPT | Performed by: ORTHOPAEDIC SURGERY

## 2023-02-04 PROCEDURE — 97535 SELF CARE MNGMENT TRAINING: CPT | Mod: GO

## 2023-02-04 PROCEDURE — 97161 PT EVAL LOW COMPLEX 20 MIN: CPT | Mod: GP

## 2023-02-04 PROCEDURE — 250N000011 HC RX IP 250 OP 636: Performed by: ORTHOPAEDIC SURGERY

## 2023-02-04 RX ORDER — OXYCODONE AND ACETAMINOPHEN 10; 325 MG/1; MG/1
1 TABLET ORAL EVERY 6 HOURS PRN
Qty: 30 TABLET | Refills: 0 | Status: SHIPPED | OUTPATIENT
Start: 2023-02-04

## 2023-02-04 RX ADMIN — FLUTICASONE PROPIONATE 1 SPRAY: 50 SPRAY, METERED NASAL at 09:18

## 2023-02-04 RX ADMIN — POTASSIUM CHLORIDE 20 MEQ: 750 TABLET, FILM COATED, EXTENDED RELEASE ORAL at 09:14

## 2023-02-04 RX ADMIN — SULFASALAZINE 1000 MG: 500 TABLET ORAL at 13:57

## 2023-02-04 RX ADMIN — KETOROLAC TROMETHAMINE 15 MG: 15 INJECTION, SOLUTION INTRAMUSCULAR; INTRAVENOUS at 06:56

## 2023-02-04 RX ADMIN — ACETAMINOPHEN 975 MG: 325 TABLET ORAL at 00:43

## 2023-02-04 RX ADMIN — FOLIC ACID 1 MG: 1 TABLET ORAL at 09:16

## 2023-02-04 RX ADMIN — ACETAMINOPHEN 975 MG: 325 TABLET ORAL at 09:16

## 2023-02-04 RX ADMIN — ASPIRIN 81 MG: 81 TABLET, COATED ORAL at 09:10

## 2023-02-04 RX ADMIN — SULFASALAZINE 1000 MG: 500 TABLET ORAL at 09:09

## 2023-02-04 RX ADMIN — OXYCODONE HYDROCHLORIDE 5 MG: 5 TABLET ORAL at 13:58

## 2023-02-04 RX ADMIN — DILTIAZEM HYDROCHLORIDE 120 MG: 120 CAPSULE, COATED, EXTENDED RELEASE ORAL at 09:10

## 2023-02-04 RX ADMIN — OXYCODONE HYDROCHLORIDE 5 MG: 5 TABLET ORAL at 05:21

## 2023-02-04 RX ADMIN — GEMFIBROZIL 600 MG: 600 TABLET ORAL at 09:13

## 2023-02-04 RX ADMIN — FUROSEMIDE 40 MG: 40 TABLET ORAL at 09:16

## 2023-02-04 RX ADMIN — CYCLOSPORINE 1 DROP: 0.5 EMULSION OPHTHALMIC at 09:16

## 2023-02-04 RX ADMIN — KETOROLAC TROMETHAMINE 15 MG: 15 INJECTION, SOLUTION INTRAMUSCULAR; INTRAVENOUS at 00:39

## 2023-02-04 RX ADMIN — METOPROLOL SUCCINATE 200 MG: 100 TABLET, EXTENDED RELEASE ORAL at 09:13

## 2023-02-04 ASSESSMENT — ACTIVITIES OF DAILY LIVING (ADL)
ADLS_ACUITY_SCORE: 23
IADL_COMMENTS: RETIRED
ADLS_ACUITY_SCORE: 23

## 2023-02-04 NOTE — PROGRESS NOTES
02/04/23 0900   Appointment Info   Signing Clinician's Name / Credentials (PT) Arleth Kwon DPT   Living Environment   People in Home spouse   Current Living Arrangements house   Home Accessibility stairs to enter home;stairs within home   Number of Stairs, Main Entrance 2   Stair Railings, Main Entrance railings safe and in good condition   Number of Stairs, Within Home, Primary one   Stair Railings, Within Home, Primary railings safe and in good condition   Transportation Anticipated family or friend will provide   Living Environment Comments Pt lives with  who can provide assist.   Self-Care   Usual Activity Tolerance moderate   Current Activity Tolerance fair   Equipment Currently Used at Home walker, rolling;walker, standard;cane, straight;grab bar, tub/shower;grab bar, toilet   Fall history within last six months yes   Number of times patient has fallen within last six months 1   Activity/Exercise/Self-Care Comment Pt IND w/ mobility, uses 4WW for mobility   General Information   Onset of Illness/Injury or Date of Surgery 02/03/23   Referring Physician Venkatesh Peguero MD   Patient/Family Therapy Goals Statement (PT) Return home   Pertinent History of Current Problem (include personal factors and/or comorbidities that impact the POC) Pt is a 76 y/o female POD #1 following Lumbar 5 to sacral 1 oblique lateral lumbar interbody fusion   Existing Precautions/Restrictions spinal   General Observations Pt supine in bed upon therapist arrival, agreeable to PT   Cognition   Affect/Mental Status (Cognition) WFL   Orientation Status (Cognition) oriented x 4   Follows Commands (Cognition) WFL   Pain Assessment   Patient Currently in Pain   (3/10 back)   Integumentary/Edema   Integumentary/Edema Comments Incision not observed, covered. Pt w/ ostomy bag, wounds under breasts and panus   Posture    Posture Forward head position;Protracted shoulders   Range of Motion (ROM)   Range of Motion ROM deficits secondary  to surgical procedure   ROM Comment Hx L ankle fusion   Strength (Manual Muscle Testing)   Strength (Manual Muscle Testing) Deficits observed during functional mobility   Bed Mobility   Comment, (Bed Mobility) Supine to sit IND   Transfers   Comment, (Transfers) Sit to stand SBA   Gait/Stairs (Locomotion)   Comment, (Gait/Stairs) Pt amb w/ FWW and CGA   Balance   Balance Comments Good seated and fair standing   Sensory Examination   Sensory Perception patient reports no sensory changes   Sensory Perception Comments Baseline numbness in L toes   Clinical Impression   Criteria for Skilled Therapeutic Intervention Yes, treatment indicated   PT Diagnosis (PT) Impaired functional mobility and gait   Influenced by the following impairments Pain, weakness, decreased activity tolerance, impaired balance   Functional limitations due to impairments Limited functional mobility requiring AD and assist   Clinical Presentation (PT Evaluation Complexity) Stable/Uncomplicated   Clinical Presentation Rationale Based on PMH, current status, and social support   Clinical Decision Making (Complexity) low complexity   Planned Therapy Interventions (PT) bed mobility training;gait training;patient/family education;stair training;strengthening;transfer training;progressive activity/exercise   Risk & Benefits of therapy have been explained evaluation/treatment results reviewed;care plan/treatment goals reviewed;risks/benefits reviewed;current/potential barriers reviewed;participants voiced agreement with care plan;participants included;patient;spouse/significant other   PT Total Evaluation Time   PT Eval, Low Complexity Minutes (37450) 10   Plan of Care Review   Plan of Care Reviewed With patient;spouse   Physical Therapy Goals   PT Frequency One time eval and treatment only   PT Predicted Duration/Target Date for Goal Attainment 02/04/23   PT Goals Bed Mobility;Transfers;Gait;Stairs   PT: Bed Mobility Independent;Supine to/from sit;Within  precautions;Goal Met   PT: Transfers Modified independent;Sit to/from stand;Assistive device;Goal Met   PT: Gait Supervision/stand-by assist;Assistive device;50 feet;Goal Met   PT: Stairs Minimal assist;1 stair;Rail on right;Goal Met   Interventions   Interventions Quick Adds Gait Training;Therapeutic Activity   Therapeutic Activity   Therapeutic Activities: dynamic activities to improve functional performance Minutes (28951) 10   Symptoms Noted During/After Treatment Fatigue   Treatment Detail/Skilled Intervention Pt sat EOB SBA, progressed to IND. Pt demos good stability. Pt attempted to don shoes IND, pt's feet swollen and unable to don shoes, pt put socks back on IND, following spinal precautions. Pt performed sit <> stand x3 reps from EOB w/ FWW and Mod I. Pt sat EOB after amb, returned to supine IND. Pt reviewed spinal precautions. All needs w/in reach, bed alarm on,  at side, RN updated   Gait Training   Gait Training Minutes (16200) 12   Symptoms Noted During/After Treatment (Gait Training) fatigue   Treatment Detail/Skilled Intervention Pt amb 70' w/ FWW and CGA, progressed to SBA. Pt demos fair speed and stability, VCs for upright posture. Seated rest break. Therapist set up platform step to mimic home set up. Pt navigated 1 step w/ handrail, HHA, and CGA. Pt demos fair stability, good safety awareness. Pt amb 30' w/ FWW and SBA. Sat EOB.   PT Discharge Planning   PT Plan d/c   PT Discharge Recommendation (DC Rec) home with assist   PT Rationale for DC Rec Pt is below baseline, currently IND for bed mob and transfers, SBA amb, and Min A stairs. Pt lives with  who can provide assist. Pt has all equipment needs, recommend pt use AD for all mobility   PT Brief overview of current status below baseline, currently IND for bed mob and transfers, SBA amb, and Min A stairs   Total Session Time   Timed Code Treatment Minutes 22   Total Session Time (sum of timed and untimed services) 32

## 2023-02-04 NOTE — PROGRESS NOTES
02/04/23 1325   Appointment Info   Signing Clinician's Name / Credentials (OT) Ruth North, OTR/L   Living Environment   People in Home spouse   Current Living Arrangements house   Home Accessibility stairs to enter home;stairs within home   Number of Stairs, Main Entrance 2   Stair Railings, Main Entrance railings safe and in good condition   Number of Stairs, Within Home, Primary one   Stair Railings, Within Home, Primary railings safe and in good condition   Transportation Anticipated family or friend will provide   Living Environment Comments walk in shower with grab bars. Raised toilet seat with grab bars   Self-Care   Usual Activity Tolerance moderate   Current Activity Tolerance fair   Equipment Currently Used at Home walker, rolling;walker, standard;cane, straight;grab bar, tub/shower;grab bar, toilet   Fall history within last six months yes   Number of times patient has fallen within last six months 1   Activity/Exercise/Self-Care Comment ind with ADLs   Instrumental Activities of Daily Living (IADL)   Previous Responsibilities meal prep;housekeeping;laundry;medication management;yardwork;shopping   IADL Comments retired   General Information   Onset of Illness/Injury or Date of Surgery 02/03/23   Referring Physician Venkatesh Peguero MD   Patient/Family Therapy Goal Statement (OT) get home   Additional Occupational Profile Info/Pertinent History of Current Problem Pt is a 78 y/o female POD #1 following Lumbar 5 to sacral 1 oblique lateral lumbar interbody fusion   Existing Precautions/Restrictions spinal   Cognitive Status Examination   Orientation Status orientation to person, place and time   Visual Perception   Visual Impairment/Limitations WNL   Range of Motion Comprehensive   General Range of Motion no range of motion deficits identified   Strength Comprehensive (MMT)   General Manual Muscle Testing (MMT) Assessment no strength deficits identified   Bed Mobility   Bed Mobility  supine-sit;sit-supine   Supine-Sit [place] (Bed Mobility) contact guard   Sit-Supine [place] (Bed Mobility) contact guard   Activities of Daily Living   BADL Assessment/Intervention lower body dressing;toileting;upper body dressing;grooming   Clinical Impression   Criteria for Skilled Therapeutic Interventions Met (OT) Yes, treatment indicated   OT Diagnosis lumbar surgery   OT Problem List-Impairments impacting ADL problems related to;activity tolerance impaired;post-surgical precautions;strength;pain   ADL comments/analysis below PLOF   Assessment of Occupational Performance 3-5 Performance Deficits   Identified Performance Deficits LB dresing, showering, IADLs   Planned Therapy Interventions (OT) ADL retraining;home program guidelines   Clinical Decision Making Complexity (OT) low complexity   Risk & Benefits of therapy have been explained evaluation/treatment results reviewed;care plan/treatment goals reviewed;participants included;patient   OT Total Evaluation Time   OT Eval, Low Complexity Minutes (30546) 10   OT Goals   Therapy Frequency (OT) One time eval and treatment   OT Predicted Duration/Target Date for Goal Attainment 02/04/23   OT Goals Upper Body Dressing;Hygiene/Grooming;Lower Body Dressing;OT Goal 1   OT: Hygiene/Grooming modified independent   OT: Upper Body Dressing Modified independent;within precautions   OT: Lower Body Dressing Modified independent;within precautions   OT: Goal 1 Pt will adhere to back precautions with 100% compliance during ALDs routine   OT Discharge Planning   OT Plan discharge   OT Discharge Recommendation (DC Rec) home with assist   OT Rationale for DC Rec Pt. demo'd safety with ADLs following precautions, recommend  to assist with IADLs as needed   OT Brief overview of current status mod (I) with ADLs

## 2023-02-04 NOTE — PROGRESS NOTES
02/04/23 1325   Appointment Info   Signing Clinician's Name / Credentials (OT) Ruth North, OTR/L   Living Environment   People in Home spouse   Current Living Arrangements house   Home Accessibility stairs to enter home;stairs within home   Number of Stairs, Main Entrance 2   Stair Railings, Main Entrance railings safe and in good condition   Number of Stairs, Within Home, Primary one   Stair Railings, Within Home, Primary railings safe and in good condition   Transportation Anticipated family or friend will provide   Living Environment Comments walk in shower with grab bars. Raised toilet seat with grab bars   Self-Care   Usual Activity Tolerance moderate   Current Activity Tolerance fair   Equipment Currently Used at Home walker, rolling;walker, standard;cane, straight;grab bar, tub/shower;grab bar, toilet   Fall history within last six months yes   Number of times patient has fallen within last six months 1   Activity/Exercise/Self-Care Comment ind with ADLs   Instrumental Activities of Daily Living (IADL)   Previous Responsibilities meal prep;housekeeping;laundry;medication management;yardwork;shopping   IADL Comments retired   General Information   Onset of Illness/Injury or Date of Surgery 02/03/23   Referring Physician Venkatesh Peguero MD   Patient/Family Therapy Goal Statement (OT) get home   Additional Occupational Profile Info/Pertinent History of Current Problem Pt is a 78 y/o female POD #1 following Lumbar 5 to sacral 1 oblique lateral lumbar interbody fusion   Existing Precautions/Restrictions spinal   Cognitive Status Examination   Orientation Status orientation to person, place and time   Visual Perception   Visual Impairment/Limitations WNL   Range of Motion Comprehensive   General Range of Motion no range of motion deficits identified   Strength Comprehensive (MMT)   General Manual Muscle Testing (MMT) Assessment no strength deficits identified   Bed Mobility   Bed Mobility  "Pt did laundry 6 weeks ago and stated that mites got on her laundry. Pt then started to itching all over. Seen in ER. Patient not requesting triage for herself. Pt had a bunch of questions about how to clean public areas to avoid scabies. Pt had many questions about scabies and her neighbor's poor hygiene that triage nurse did not have resources to answer. Pt given the phone number to the [facility] for f/u.        Reason for Disposition   [1] Looks infected (spreading redness, pus) AND [2] no fever    Additional Information   Negative: Patient sounds very sick or weak to the triager   Negative: [1] Fever AND [2] spreading red area or streak   Negative: [1] Rash is painful to touch AND [2] fever   Negative: [1] Diagnosed with "crusted scabies" (Turkmen scabies) AND [2] fever   Negative: [1] Looks infected (spreading redness, pus) AND [2] large red area (> 2 in. or 5 cm)   Negative: [1] Looks infected (spreading redness, pus) AND [2] diabetes mellitus or weak immune system (e.g., HIV positive, cancer chemo, splenectomy, organ transplant, chronic steroids)    Protocols used: Scabies Follow-up Call-A-    " supine-sit;sit-supine   Supine-Sit [place] (Bed Mobility) contact guard   Sit-Supine [place] (Bed Mobility) contact guard   Activities of Daily Living   BADL Assessment/Intervention lower body dressing;toileting;upper body dressing;grooming   Upper Body Dressing Assessment/Training   [place] Level (Upper Body Dressing) verbal cues   Lower Body Dressing Assessment/Training   [place] Level (Lower Body Dressing) verbal cues;contact guard assist   Grooming Assessment/Training   [place] Level (Grooming) verbal cues;modified independence   Toileting   [place] Level (Toileting) modified independence;verbal cues   Clinical Impression   Criteria for Skilled Therapeutic Interventions Met (OT) Yes, treatment indicated   OT Diagnosis lumbar surgery   OT Problem List-Impairments impacting ADL problems related to;activity tolerance impaired;post-surgical precautions;strength;pain   ADL comments/analysis below PLOF   Assessment of Occupational Performance 3-5 Performance Deficits   Identified Performance Deficits LB dresing, showering, IADLs   Planned Therapy Interventions (OT) ADL retraining;home program guidelines   Clinical Decision Making Complexity (OT) low complexity   Risk & Benefits of therapy have been explained evaluation/treatment results reviewed;care plan/treatment goals reviewed;participants included;patient   OT Total Evaluation Time   OT Eval, Low Complexity Minutes (95505) 10   OT Goals   Therapy Frequency (OT) One time eval and treatment   OT Predicted Duration/Target Date for Goal Attainment 02/04/23   OT Goals Upper Body Dressing;Hygiene/Grooming;Lower Body Dressing;OT Goal 1   OT: Hygiene/Grooming modified independent   OT: Upper Body Dressing Modified independent;within precautions   OT: Lower Body Dressing Modified independent;within precautions   OT: Goal 1 Pt will adhere to back precautions with 100% compliance during ALDs routine   OT Discharge Planning   OT Plan discharge    OT Discharge Recommendation (DC Rec) home with assist   OT Rationale for DC Rec Pt. demo'd safety with ADLs following precautions, recommend  to assist with IADLs as needed   OT Brief overview of current status mod (I) with ADLs   Total Session Time   Total Session Time (sum of timed and untimed services) 10

## 2023-02-04 NOTE — PLAN OF CARE
Goal Outcome Evaluation:         1645 pt arrived to floor from PACU    Patient vital signs are at baseline: Yes afebrile 3L NC. Capno WNL  Patient able to ambulate as they were prior to admission or with assist devices provided by therapies during their stay:  No,  Reason:  pt not OOB yet. Will stand and dangle this evening yet.  Patient MUST void prior to discharge:  No,  Reason:  vo patent with adequate output  Patient able to tolerate oral intake:  Yes  Pain has adequate pain control using Oral analgesics:  Yes pain managed with scheduled Tylenol and Toradol. PRN Oxycodone given once  Does patient have an identified :  Yes  at bedside  Has goal D/C date and time been discussed with patient:  Yes pt is planning tomorrow or Sunday to discharge home with   Pt had Colonoscopy in place. Pt came in with Skin wounds under right breast and pannus area. Interdry applied. Pt said she uses Nystatin at home.

## 2023-02-04 NOTE — DISCHARGE SUMMARY
This patient was admitted on 2/3/23 for following surgery:    PRE-PROCEDURE DIAGNOSIS:  1) L5 to S1  degenerative  30% sondylolisthesis and spinal stenosis with neurogenic claudication     POST-PROCEDURE DIAGNOSIS:  1) Same as above  PROCEDURE PERFORMED:  1) L5S1 oblique lateral lumbar interbody fusion with discectomy, preparation of the endplate and placement of a bullet cage packed with calcium triphosphate soaked in bone marrow anterior to the transverse process in modified prone position, with intraoperative biplanar fluoroscopic imaging and electrophysiological monitoring.also supplementation with small dose INFUSE kit packed in the disc space and inside the cage  2) L5 S1 Posterior minimally invasive pedicle screw placement and posterolateral instrumentation and fusion with lnk system with intraoperative biplanar fluoroscopic imaging and electrophysiological monitoring.  3) Transpedicular Bone marrow aspiration  4)  The procedure was very difficult requiring extra 50% of normal operating time due to severe spondylolisthesis.     EBL: 50cc        Surgeon: Venkatesh Merrill PA-C     1st assistant needed for patient positioning wound closure and assistance with instrumentation for this complex spine surgery.            HISTORY: Please refer to my clinic note for full details, but in short, patient is a 77 -year-old  female  with above diagnosis not responding to usual conservative therapy. Patient was set up for the surgery as mentioned above and was taken to surgery as mentioned above after all risks and benefits were explained.     PROCEDURE:  The patient was taken to surgery. After general anesthesia was applied, SCDs and Hollins placed and preoperative antibiotic given, then patient has been positioned on the Manjinder table and Russell frame in a modified prone position for ease of access from the left side.  Hollins catheter was placed by a urologist due to his urethral stricture requiring dilatation  via cystoscope.      AP and lateral fluoroscopic images are positioned. Patient has been prepped and draped in sterile fashion. The landmarks, including Spinal process, transverse process, disk space, endplates and pedicels are identified and marked.  A Jamshidi needle is placed in upper right pedicle inside of the vertebral body and bone marrow has been aspirated to be mixed with biologics to introduce Stem cells to the biologics.  Following steps are then taken for levels:  L5 S1    Cage size 12mm high and 27 mm long Titaium packed with bone marrow soaded calcium TPP and small dose INFUSE kit due to her smoking history.      Cages went in via the Kambin's triangle at EMG silent window greater than 4 Ma.      The patient was turned using the rotation of the surgical table so that a near direct anterior-lateral approach to the lumbar spine could be achieved. A small incision was then made superior to the mid iliac crest and then using biplanar fluoroscopic visualization, under electrophysiological monitoring and stimulation, we introduced an electrophysiological probe through the retro peritoneal space into the desired discs anterior to the transverse processes and then passed it into the disc space after finding a silent window. The sleeve is retained and the probe is removed, then a K wire was passed sequentially into the disk space. A dilating tube was then passed along this same route. Following this, a working channel was then passed sequentially into the disc spaces. The working channel was manually held in position while a series of disc cleaning tools was passed through the channel to remove the affected discs under clear and direct biplanar fluoroscopic visualization, decompress the nerve roots, and decorticate the vertebral endplates at those segments.  Arthrodesis of the intervertebral spaces via an anterior retroperitoneal exposure and application of an intervertebral biomechanical device was then  accomplished by using the working channel that had been placed in the retroperitoneal space anterior to the transverse processes. After adequate decompression and preparation of the endplates, we then put calcium triphosphate soaked in bone marrow anterior into disc space and the working channel was then removed after a K-Wire was reinserted. Then a interbody was packed tightly with allograft bone for stabilization and arthrodesis of the intervertebral spaces and inserted into the mid portion of the intervertebral discs. This was done under biplanar fluoroscopic visualization. All bone was confined to the borders of the disc space.   The disc space and cage was packed with both small dose INFUSE and bone marrow soaked tricalcium phosphate.   Posterior facets and pars area were also packed with the same after decortication with minimally invasive device.      Physiological Decompression of foramen, lateral recess and spinal canal is achieved by restoring the anatomical distance of inter discal space and increasing inter pedicular distance with interbody graft.     Following steps are then taken for levels:      L5  7.5mm Screw size Right 60 Left 60  S1  7.5mm Screw size Right 50 and Left 50        Then patient is rotated for a true prone position. Then entry point for the pedicles is identified in the AP and lateral view, and then skin incision has been injected with local anesthetic. Then we entered the pedicle with a Jamshidi needle. Over the Jamshidi needle, we introduced the K wire in Vertebral body.  Additionally we use a small periosteal elevator along the screws to refresh the surface of the bone and facet and put minimal amount of Calcium-triphosphate soaked in bone marrow for additional posterolateral fusion. Over the K wire then , we dilate the muscle with the dilator and then put a pedicle screws bilaterally. Screws are all silent up to   25 MA of stimulation. After screws are all placed, we put pamela in  place and under fluoroscopic imaging, we locked the pamela in place and removed the screw tops and then each incision has been closed with 2-0 Vicryl suture and  Skin staples.      Final ap and lateral  C arm images showed good positioning of all the hardware     6incisions were made for this case.  The cage went in about 2 cm incision.  Others were 1 cm to 2Cm lengths.               DISPOSITION: To PACU with postoperative antibiotic. All counts are correct at the end of the surgery.     Venkatesh Peguero MD           Hospital Course:    Postop patient did well. No leg pain.  Motor intact.  Ambulated.  Was able to be discharged home POD #1. Further Follow-up at [facility].

## 2023-02-04 NOTE — PLAN OF CARE
Goal Outcome Evaluation:             Patient vital signs are at baseline: Yes  Patient able to ambulate as they were prior to admission or with assist devices provided by therapies during their stay:  No,  Reason:  pt is A-1 with gate belt and walker dangled legs .  Patient MUST void prior to discharge:  No,  Reason: pt has Hollins cathter.   Patient able to tolerate oral intake:  Yes  Pain has adequate pain control using Oral analgesics:  Yes  Does patient have an identified :  Yes, .  Has goal D/C date and time been discussed with patient:  Yes   , possible discharge  on 2/4/23.     Pt is A&OX4.pleasant. LS CTA. IV infusing , intact,no infiltration.  No C/O N/V. No T/N .  Pain 5/10. Takes PRN Oxycodone q4 hrs. , scheduled Tylenol and Toradol. Pt has stoma beg to LUQ of abdomen.  Hollins inserted and drains tea color  urine .abdomen large in size, asymmetric,  scar tissue from past surgery. Redness under Under R/ breast LLQ of and  RLQ . Light redness under L/ breast. Inter dry placed. Incision  with dressing to back, intact, no drainage.

## 2023-02-04 NOTE — PLAN OF CARE
Goal Outcome Evaluation:       Patient vital signs are at baseline: Yes  Patient able to ambulate as they were prior to admission or with assist devices provided by therapies during their stay:  Yes up with assist of 1 gait belt and walker. NO brace at this time MD aware  Patient MUST void prior to discharge:  Yes  Patient able to tolerate oral intake:  Yes  Pain has adequate pain control using Oral analgesics:  Yes   Does patient have an identified :  Yes  at bedside  Has goal D/C date and time been discussed with patient:  Yes pt discharging today. Oxycodone filled here. Discharge instructions given to pt. Forms signed and on chart. All personal belongings take with pt.    colostomy bag was not emptied. Pt said nothing was in it for output. Inredry to pannus folds and under bilateral breasts. Pt has rash,skin moister and skin breakdown chronically she said.

## 2023-02-04 NOTE — PLAN OF CARE
Occupational Therapy Discharge Summary    Reason for therapy discharge:    All goals and outcomes met, no further needs identified.    Progress towards therapy goal(s). See goals on Care Plan in [facility] electronic health record for goal details.  Goals met    Therapy recommendation(s):    No further therapy is recommended.

## 2023-02-04 NOTE — CONSULTS
Consult reviewed.  Chart reviewed.  Patient s/p spine surgery.  Has worked with therapies and has been cleared by surgeon to discharge home.      Hospitalist will defer consult.   Home meds rec'd for discharge.    [facility] PAC

## 2023-02-04 NOTE — PROGRESS NOTES
Vitals stable.  Doing well.  Some numbness left leg.  No real pain.  Motor intact.  Stood up.   PT and ambulate today.  Most likely home today.  Will write discharge Rx and order.

## 2023-11-12 ENCOUNTER — HEALTH MAINTENANCE LETTER (OUTPATIENT)
Age: 78
End: 2023-11-12

## 2024-12-20 ENCOUNTER — TELEPHONE (OUTPATIENT)
Dept: GASTROENTEROLOGY | Facility: CLINIC | Age: 79
End: 2024-12-20

## 2024-12-28 ENCOUNTER — HEALTH MAINTENANCE LETTER (OUTPATIENT)
Age: 79
End: 2024-12-28

## 2024-12-30 ENCOUNTER — PATIENT OUTREACH (OUTPATIENT)
Dept: GASTROENTEROLOGY | Facility: CLINIC | Age: 79
End: 2024-12-30
Payer: MEDICARE

## 2024-12-30 DIAGNOSIS — K83.1 BILE DUCT STRICTURE (H): Primary | ICD-10-CM

## 2024-12-30 DIAGNOSIS — K86.89 PANCREATIC DUCT STRICTURE: ICD-10-CM

## 2024-12-30 DIAGNOSIS — R97.8 ELEVATED CA 19-9 LEVEL: ICD-10-CM

## 2024-12-30 NOTE — TELEPHONE ENCOUNTER
"Contacted pt to discuss referral and Dr Serrano's recommendations     1) New CT CAP pancreatic protocol. Ind - biliary and pancreatic duct strictures.   2) Repeat CA 19-9.   3) EUS/ERCP with me/Demetrius or on-call provider within 3-4 weeks. Will need to remove the biliary stent for this EUS and need replaced after procedure.   4) Follow-up clinic visit with me (rosalba [place]) 1-2 weeks after EUS.   5) Ideally surg onc consult same day as the clinic visit with me.     Please assist in scheduling:     Procedure/Imaging/Clinic: EUS/ERCP   Physician: Zach and ERCP colleague   Timing: Within 3-4 weeks   Scope time needed:20 minute EUS time.   Anesthesia:General   Dx: Double duct sign, prior EUS with \"calcified mass\". No calcification on CT. Elevated CA 19-9   Tier:Tier 2 - Not life/limb threatening but potential for  patient morbidity/mortality or adverse  patient/disease outcome if  surgery/procedure not done within 30 day   Location: [facility]   OK to schedule while attending: Yes   Header of letter for pt communication:    Repeat EUS examination, with repeat ERCP to replace stent after EUS.     Comments:    Will need anticoagulation held.      Pt reviewed her recent health events that led to this referral. She went to her primary this fall reported feeling sick to her stomach and full feeling, reported orange colored urine. Had an ERCP/EUS in August and again in October. Was hospitalized after that with pancreatitis and sepsis. Last ERCP on December 10th. No malignancy with path on any of these procedures. Did develop a blood clot, is taking Eliquis.     Pt in agreement with CT and CA 19-9 on 1/2/25 and would like to plan for the procedure on 1/16/25.   Follow up virtual visit with Dr Serrano on 2/3/25 at 1pm, rosalba.     Explained OR will call 1-2 days prior to procedure date with arrival time, will need a , someone to stay with them for 24 hours and should stay in town for 24 hours (within 45 min of Hospital) " post procedure    Patient needs to get pre-op physical completed. If outside  health system will need physical faxed to number [phone]     If you do not get a preop physical, your procedure could be cancelled, patient voiced understanding*    Preop Plan: [facility] at [email or web address] Pt will make appointment within 30 days of procedure.     Does patient have any history of gastric bypass/gastric surgery/altered panc/bili anatomy? none    Does patient have [employer] insurance?: Medicare    Med Review    Blood thinner -  Eliquis, hold 2 days prior to procedure. Pt will hold on 1/14/25  ASA - 81mg  Diabetic -    Any meds by injection or mouth for weight loss or diabetes- none    Patient Education r/t procedure: mychart and letter by mail    A pre-op nurse will call 1-2 days prior to the procedure.    NPO/Prep:   Adults and Children of all ages may consume solids up to 8 hours prior to arrival time - may consume clear liquids up to 1 hour prior to arrival time.    Verbalized understanding of all instructions. All questions answered.     Procedure order not yet placed, will review with Dr Lilly for ERCP on 1/16 and contact pt again to confirm date. Also discuss surg onc referral at that time, order placed.    Elisabeth Valentin, RN, BSN,   [facility]  Care coordinator

## 2024-12-31 ENCOUNTER — PREP FOR PROCEDURE (OUTPATIENT)
Dept: GASTROENTEROLOGY | Facility: CLINIC | Age: 79
End: 2024-12-31
Payer: MEDICARE

## 2024-12-31 DIAGNOSIS — R97.8 ELEVATED CA 19-9 LEVEL: ICD-10-CM

## 2024-12-31 DIAGNOSIS — K86.89 PANCREATIC DUCT STRICTURE: Primary | ICD-10-CM

## 2024-12-31 DIAGNOSIS — K83.1 STRICTURE OF BILE DUCT (H): ICD-10-CM

## 2024-12-31 DIAGNOSIS — R93.3 ABNORMAL FINDINGS ON EXAMINATION OF GASTROINTESTINAL TRACT: ICD-10-CM

## 2025-01-02 ENCOUNTER — LAB (OUTPATIENT)
Dept: LAB | Facility: CLINIC | Age: 80
End: 2025-01-02
Attending: INTERNAL MEDICINE
Payer: MEDICARE

## 2025-01-02 ENCOUNTER — HOSPITAL ENCOUNTER (OUTPATIENT)
Dept: CT IMAGING | Facility: CLINIC | Age: 80
End: 2025-01-02
Attending: INTERNAL MEDICINE
Payer: MEDICARE

## 2025-01-02 DIAGNOSIS — K83.1 BILE DUCT STRICTURE (H): ICD-10-CM

## 2025-01-02 DIAGNOSIS — K86.89 PANCREATIC DUCT STRICTURE: ICD-10-CM

## 2025-01-02 DIAGNOSIS — R97.8 ELEVATED CA 19-9 LEVEL: ICD-10-CM

## 2025-01-02 LAB
CREAT BLD-MCNC: 0.5 MG/DL (ref 0.5–1)
EGFRCR SERPLBLD CKD-EPI 2021: >60 ML/MIN/1.73M2

## 2025-01-02 PROCEDURE — 250N000011 HC RX IP 250 OP 636: Performed by: INTERNAL MEDICINE

## 2025-01-02 PROCEDURE — 82565 ASSAY OF CREATININE: CPT

## 2025-01-02 PROCEDURE — 74178 CT ABD&PLV WO CNTR FLWD CNTR: CPT

## 2025-01-02 PROCEDURE — 86301 IMMUNOASSAY TUMOR CA 19-9: CPT

## 2025-01-02 PROCEDURE — 250N000009 HC RX 250: Performed by: INTERNAL MEDICINE

## 2025-01-02 PROCEDURE — 36415 COLL VENOUS BLD VENIPUNCTURE: CPT

## 2025-01-02 RX ORDER — IOPAMIDOL 755 MG/ML
500 INJECTION, SOLUTION INTRAVASCULAR ONCE
Status: COMPLETED | OUTPATIENT
Start: 2025-01-02 | End: 2025-01-02

## 2025-01-02 RX ADMIN — SODIUM CHLORIDE 65 ML: 9 INJECTION, SOLUTION INTRAVENOUS at 09:34

## 2025-01-02 RX ADMIN — IOPAMIDOL 100 ML: 755 INJECTION, SOLUTION INTRAVENOUS at 09:34

## 2025-01-02 NOTE — TELEPHONE ENCOUNTER
Contacted pt to discuss procedure plans for 1/16 being solidified and mychart communication sent. Also to discuss recs for surg onc visit around same timing as Dr Serrano's visit on 2/3/25. Left VM.     1530  Pt returned call , discussed plans for procedure on 1/16. Also discussed surg onc follow up. Pt also received a call from [facility] asking if Dr Loaiza spoke to her about the option of liver transplant. She did not know this was being considered and has a call to Dr Loaiza's office to discuss more. Msg routed to surg onc to coordinate virtual visit on 2/3 if possible.

## 2025-01-04 LAB — CANCER AG19-9 SERPL IA-ACNC: 310 U/ML

## 2025-01-06 ENCOUNTER — PATIENT OUTREACH (OUTPATIENT)
Dept: ONCOLOGY | Facility: CLINIC | Age: 80
End: 2025-01-06
Payer: MEDICARE
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
## 2025-01-15 ENCOUNTER — ANESTHESIA EVENT (OUTPATIENT)
Dept: SURGERY | Facility: CLINIC | Age: 80
End: 2025-01-15
Payer: MEDICARE

## 2025-01-15 ASSESSMENT — LIFESTYLE VARIABLES: TOBACCO_USE: 0

## 2025-01-16 ENCOUNTER — HOSPITAL ENCOUNTER (OUTPATIENT)
Facility: CLINIC | Age: 80
Discharge: HOME | End: 2025-01-16
Attending: INTERNAL MEDICINE | Admitting: INTERNAL MEDICINE
Payer: MEDICARE

## 2025-01-16 ENCOUNTER — ANESTHESIA (OUTPATIENT)
Dept: SURGERY | Facility: CLINIC | Age: 80
End: 2025-01-16
Payer: MEDICARE

## 2025-01-16 VITALS
BODY MASS INDEX: 32.33 KG/M2 | TEMPERATURE: 97.5 F | SYSTOLIC BLOOD PRESSURE: 142 MMHG | HEIGHT: 64 IN | RESPIRATION RATE: 16 BRPM | DIASTOLIC BLOOD PRESSURE: 77 MMHG | WEIGHT: 189.38 LBS | OXYGEN SATURATION: 100 % | HEART RATE: 61 BPM

## 2025-01-16 LAB
ALBUMIN SERPL BCG-MCNC: 4 G/DL (ref 3.5–5.2)
ALP SERPL-CCNC: 255 U/L (ref 40–150)
ALT SERPL W P-5'-P-CCNC: 27 U/L (ref 0–50)
ANION GAP SERPL CALCULATED.3IONS-SCNC: 12 MMOL/L (ref 7–15)
AST SERPL W P-5'-P-CCNC: 37 U/L (ref 0–45)
BILIRUB SERPL-MCNC: 0.4 MG/DL
BUN SERPL-MCNC: 8.4 MG/DL (ref 8–23)
CALCIUM SERPL-MCNC: 9.8 MG/DL (ref 8.8–10.4)
CHLORIDE SERPL-SCNC: 102 MMOL/L (ref 98–107)
CREAT SERPL-MCNC: 0.53 MG/DL (ref 0.51–0.95)
EGFRCR SERPLBLD CKD-EPI 2021: >90 ML/MIN/1.73M2
ERYTHROCYTE [DISTWIDTH] IN BLOOD BY AUTOMATED COUNT: 14.6 % (ref 10–15)
GLUCOSE SERPL-MCNC: 118 MG/DL (ref 70–99)
HCO3 SERPL-SCNC: 23 MMOL/L (ref 22–29)
HCT VFR BLD AUTO: 40.7 % (ref 35–47)
HGB BLD-MCNC: 13 G/DL (ref 11.7–15.7)
LIPASE SERPL-CCNC: 54 U/L (ref 13–60)
MCH RBC QN AUTO: 26.7 PG (ref 26.5–33)
MCHC RBC AUTO-ENTMCNC: 31.9 G/DL (ref 31.5–36.5)
MCV RBC AUTO: 84 FL (ref 78–100)
PLATELET # BLD AUTO: 300 10E3/UL (ref 150–450)
POTASSIUM SERPL-SCNC: 3.7 MMOL/L (ref 3.4–5.3)
PROT SERPL-MCNC: 7 G/DL (ref 6.4–8.3)
RBC # BLD AUTO: 4.86 10E6/UL (ref 3.8–5.2)
SODIUM SERPL-SCNC: 137 MMOL/L (ref 135–145)
WBC # BLD AUTO: 5.2 10E3/UL (ref 4–11)

## 2025-01-16 PROCEDURE — 250N000011 HC RX IP 250 OP 636

## 2025-01-16 PROCEDURE — 36415 COLL VENOUS BLD VENIPUNCTURE: CPT | Performed by: INTERNAL MEDICINE

## 2025-01-16 PROCEDURE — 710N000010 HC RECOVERY PHASE 1, LEVEL 2, PER MIN: Performed by: INTERNAL MEDICINE

## 2025-01-16 PROCEDURE — 250N000009 HC RX 250

## 2025-01-16 PROCEDURE — 83690 ASSAY OF LIPASE: CPT | Performed by: INTERNAL MEDICINE

## 2025-01-16 PROCEDURE — 88172 CYTP DX EVAL FNA 1ST EA SITE: CPT | Mod: 26 | Performed by: PATHOLOGY

## 2025-01-16 PROCEDURE — 88173 CYTOPATH EVAL FNA REPORT: CPT | Mod: TC | Performed by: INTERNAL MEDICINE

## 2025-01-16 PROCEDURE — 999N000141 HC STATISTIC PRE-PROCEDURE NURSING ASSESSMENT: Performed by: INTERNAL MEDICINE

## 2025-01-16 PROCEDURE — 82310 ASSAY OF CALCIUM: CPT | Performed by: INTERNAL MEDICINE

## 2025-01-16 PROCEDURE — 85041 AUTOMATED RBC COUNT: CPT | Performed by: INTERNAL MEDICINE

## 2025-01-16 PROCEDURE — 360N000076 HC SURGERY LEVEL 3, PER MIN: Performed by: INTERNAL MEDICINE

## 2025-01-16 PROCEDURE — 88173 CYTOPATH EVAL FNA REPORT: CPT | Mod: 26 | Performed by: PATHOLOGY

## 2025-01-16 PROCEDURE — 272N000001 HC OR GENERAL SUPPLY STERILE: Performed by: INTERNAL MEDICINE

## 2025-01-16 PROCEDURE — 250N000025 HC SEVOFLURANE, PER MIN: Performed by: INTERNAL MEDICINE

## 2025-01-16 PROCEDURE — 258N000003 HC RX IP 258 OP 636

## 2025-01-16 PROCEDURE — 250N000013 HC RX MED GY IP 250 OP 250 PS 637

## 2025-01-16 PROCEDURE — 82565 ASSAY OF CREATININE: CPT | Performed by: INTERNAL MEDICINE

## 2025-01-16 PROCEDURE — 370N000017 HC ANESTHESIA TECHNICAL FEE, PER MIN: Performed by: INTERNAL MEDICINE

## 2025-01-16 PROCEDURE — 710N000012 HC RECOVERY PHASE 2, PER MINUTE: Performed by: INTERNAL MEDICINE

## 2025-01-16 PROCEDURE — 88305 TISSUE EXAM BY PATHOLOGIST: CPT | Mod: 26 | Performed by: PATHOLOGY

## 2025-01-16 PROCEDURE — 85018 HEMOGLOBIN: CPT | Performed by: INTERNAL MEDICINE

## 2025-01-16 RX ORDER — PROCHLORPERAZINE MALEATE 5 MG/1
5 TABLET ORAL EVERY 6 HOURS PRN
Status: DISCONTINUED | OUTPATIENT
Start: 2025-01-16 | End: 2025-01-16 | Stop reason: HOSPADM

## 2025-01-16 RX ORDER — NALOXONE HYDROCHLORIDE 0.4 MG/ML
0.1 INJECTION, SOLUTION INTRAMUSCULAR; INTRAVENOUS; SUBCUTANEOUS
Status: DISCONTINUED | OUTPATIENT
Start: 2025-01-16 | End: 2025-01-16 | Stop reason: HOSPADM

## 2025-01-16 RX ORDER — NALOXONE HYDROCHLORIDE 0.4 MG/ML
0.4 INJECTION, SOLUTION INTRAMUSCULAR; INTRAVENOUS; SUBCUTANEOUS
Status: DISCONTINUED | OUTPATIENT
Start: 2025-01-16 | End: 2025-01-16 | Stop reason: HOSPADM

## 2025-01-16 RX ORDER — SODIUM CHLORIDE, SODIUM LACTATE, POTASSIUM CHLORIDE, CALCIUM CHLORIDE 600; 310; 30; 20 MG/100ML; MG/100ML; MG/100ML; MG/100ML
INJECTION, SOLUTION INTRAVENOUS CONTINUOUS
Status: DISCONTINUED | OUTPATIENT
Start: 2025-01-16 | End: 2025-01-16 | Stop reason: HOSPADM

## 2025-01-16 RX ORDER — HYDROMORPHONE HCL IN WATER/PF 6 MG/30 ML
0.4 PATIENT CONTROLLED ANALGESIA SYRINGE INTRAVENOUS EVERY 5 MIN PRN
Status: DISCONTINUED | OUTPATIENT
Start: 2025-01-16 | End: 2025-01-16 | Stop reason: HOSPADM

## 2025-01-16 RX ORDER — ONDANSETRON 2 MG/ML
INJECTION INTRAMUSCULAR; INTRAVENOUS PRN
Status: DISCONTINUED | OUTPATIENT
Start: 2025-01-16 | End: 2025-01-16

## 2025-01-16 RX ORDER — ONDANSETRON 2 MG/ML
4 INJECTION INTRAMUSCULAR; INTRAVENOUS EVERY 30 MIN PRN
Status: DISCONTINUED | OUTPATIENT
Start: 2025-01-16 | End: 2025-01-16 | Stop reason: HOSPADM

## 2025-01-16 RX ORDER — FLUMAZENIL 0.1 MG/ML
0.2 INJECTION, SOLUTION INTRAVENOUS
Status: DISCONTINUED | OUTPATIENT
Start: 2025-01-16 | End: 2025-01-16 | Stop reason: HOSPADM

## 2025-01-16 RX ORDER — ONDANSETRON 4 MG/1
4 TABLET, ORALLY DISINTEGRATING ORAL EVERY 30 MIN PRN
Status: DISCONTINUED | OUTPATIENT
Start: 2025-01-16 | End: 2025-01-16 | Stop reason: HOSPADM

## 2025-01-16 RX ORDER — HYDROMORPHONE HCL IN WATER/PF 6 MG/30 ML
0.2 PATIENT CONTROLLED ANALGESIA SYRINGE INTRAVENOUS EVERY 5 MIN PRN
Status: DISCONTINUED | OUTPATIENT
Start: 2025-01-16 | End: 2025-01-16 | Stop reason: HOSPADM

## 2025-01-16 RX ORDER — PROPOFOL 10 MG/ML
INJECTION, EMULSION INTRAVENOUS PRN
Status: DISCONTINUED | OUTPATIENT
Start: 2025-01-16 | End: 2025-01-16

## 2025-01-16 RX ORDER — CITRIC ACID/SODIUM CITRATE 334-500MG
30 SOLUTION, ORAL ORAL ONCE
Status: COMPLETED | OUTPATIENT
Start: 2025-01-16 | End: 2025-01-16

## 2025-01-16 RX ORDER — ONDANSETRON 4 MG/1
4 TABLET, ORALLY DISINTEGRATING ORAL EVERY 6 HOURS PRN
Status: DISCONTINUED | OUTPATIENT
Start: 2025-01-16 | End: 2025-01-16 | Stop reason: HOSPADM

## 2025-01-16 RX ORDER — LIDOCAINE 40 MG/G
CREAM TOPICAL
Status: DISCONTINUED | OUTPATIENT
Start: 2025-01-16 | End: 2025-01-16 | Stop reason: HOSPADM

## 2025-01-16 RX ORDER — OXYCODONE HYDROCHLORIDE 10 MG/1
10 TABLET ORAL
Status: DISCONTINUED | OUTPATIENT
Start: 2025-01-16 | End: 2025-01-16 | Stop reason: HOSPADM

## 2025-01-16 RX ORDER — NALOXONE HYDROCHLORIDE 0.4 MG/ML
0.2 INJECTION, SOLUTION INTRAMUSCULAR; INTRAVENOUS; SUBCUTANEOUS
Status: DISCONTINUED | OUTPATIENT
Start: 2025-01-16 | End: 2025-01-16 | Stop reason: HOSPADM

## 2025-01-16 RX ORDER — FENTANYL CITRATE 50 UG/ML
INJECTION, SOLUTION INTRAMUSCULAR; INTRAVENOUS PRN
Status: DISCONTINUED | OUTPATIENT
Start: 2025-01-16 | End: 2025-01-16

## 2025-01-16 RX ORDER — DEXAMETHASONE SODIUM PHOSPHATE 4 MG/ML
INJECTION, SOLUTION INTRA-ARTICULAR; INTRALESIONAL; INTRAMUSCULAR; INTRAVENOUS; SOFT TISSUE PRN
Status: DISCONTINUED | OUTPATIENT
Start: 2025-01-16 | End: 2025-01-16

## 2025-01-16 RX ORDER — LIDOCAINE HYDROCHLORIDE 20 MG/ML
INJECTION, SOLUTION INFILTRATION; PERINEURAL PRN
Status: DISCONTINUED | OUTPATIENT
Start: 2025-01-16 | End: 2025-01-16

## 2025-01-16 RX ORDER — DEXAMETHASONE SODIUM PHOSPHATE 4 MG/ML
4 INJECTION, SOLUTION INTRA-ARTICULAR; INTRALESIONAL; INTRAMUSCULAR; INTRAVENOUS; SOFT TISSUE
Status: DISCONTINUED | OUTPATIENT
Start: 2025-01-16 | End: 2025-01-16 | Stop reason: HOSPADM

## 2025-01-16 RX ORDER — ONDANSETRON 2 MG/ML
4 INJECTION INTRAMUSCULAR; INTRAVENOUS EVERY 6 HOURS PRN
Status: DISCONTINUED | OUTPATIENT
Start: 2025-01-16 | End: 2025-01-16 | Stop reason: HOSPADM

## 2025-01-16 RX ORDER — FENTANYL CITRATE 50 UG/ML
50 INJECTION, SOLUTION INTRAMUSCULAR; INTRAVENOUS EVERY 5 MIN PRN
Status: DISCONTINUED | OUTPATIENT
Start: 2025-01-16 | End: 2025-01-16 | Stop reason: HOSPADM

## 2025-01-16 RX ORDER — OXYCODONE HYDROCHLORIDE 5 MG/1
5 TABLET ORAL
Status: DISCONTINUED | OUTPATIENT
Start: 2025-01-16 | End: 2025-01-16 | Stop reason: HOSPADM

## 2025-01-16 RX ORDER — FENTANYL CITRATE 50 UG/ML
25 INJECTION, SOLUTION INTRAMUSCULAR; INTRAVENOUS EVERY 5 MIN PRN
Status: DISCONTINUED | OUTPATIENT
Start: 2025-01-16 | End: 2025-01-16 | Stop reason: HOSPADM

## 2025-01-16 RX ADMIN — SUGAMMADEX 200 MG: 100 INJECTION, SOLUTION INTRAVENOUS at 09:29

## 2025-01-16 RX ADMIN — FENTANYL CITRATE 100 MCG: 50 INJECTION INTRAMUSCULAR; INTRAVENOUS at 08:11

## 2025-01-16 RX ADMIN — SODIUM CHLORIDE, POTASSIUM CHLORIDE, SODIUM LACTATE AND CALCIUM CHLORIDE: 600; 310; 30; 20 INJECTION, SOLUTION INTRAVENOUS at 08:08

## 2025-01-16 RX ADMIN — PHENYLEPHRINE HYDROCHLORIDE 100 MCG: 10 INJECTION INTRAVENOUS at 08:30

## 2025-01-16 RX ADMIN — Medication 70 MG: at 08:13

## 2025-01-16 RX ADMIN — ONDANSETRON 4 MG: 2 INJECTION INTRAMUSCULAR; INTRAVENOUS at 09:28

## 2025-01-16 RX ADMIN — SODIUM CITRATE AND CITRIC ACID MONOHYDRATE 30 ML: 500; 334 SOLUTION ORAL at 07:35

## 2025-01-16 RX ADMIN — PHENYLEPHRINE HYDROCHLORIDE 100 MCG: 10 INJECTION INTRAVENOUS at 08:56

## 2025-01-16 RX ADMIN — PROPOFOL 120 MG: 10 INJECTION, EMULSION INTRAVENOUS at 08:13

## 2025-01-16 RX ADMIN — DEXAMETHASONE SODIUM PHOSPHATE 8 MG: 4 INJECTION, SOLUTION INTRA-ARTICULAR; INTRALESIONAL; INTRAMUSCULAR; INTRAVENOUS; SOFT TISSUE at 08:37

## 2025-01-16 RX ADMIN — LIDOCAINE HYDROCHLORIDE 80 MG: 20 INJECTION, SOLUTION INFILTRATION; PERINEURAL at 08:11

## 2025-01-16 RX ADMIN — FAMOTIDINE 20 MG: 10 INJECTION, SOLUTION INTRAVENOUS at 07:34

## 2025-01-16 ASSESSMENT — ACTIVITIES OF DAILY LIVING (ADL)
ADLS_ACUITY_SCORE: 42

## 2025-01-16 NOTE — BRIEF OP NOTE
[facility]    Brief Operative Note    Pre-operative diagnosis: Pancreatic duct stricture [K86.89]  Stricture of bile duct (H) [K83.1]  Elevated CA 19-9 level [R97.8]  Abnormal findings on examination of gastrointestinal tract [R93.3]  Post-operative diagnosis Same as pre-operative diagnosis    Procedure: ESOPHAGOGASTRODUODENOSCOPY, WITH FINE NEEDLE ASPIRATION BIOPSY, WITH ENDOSCOPIC ULTRASOUND GUIDANCE, N/A - Esophagus    Surgeon: Surgeons and Role:     * Cayden Serrano MD - Primary  Anesthesia: General   Estimated Blood Loss: None    Drains: None  Specimens:   ID Type Source Tests Collected by Time Destination   1 : PANCREATIC HEAD MASS Fine Needle Aspiration Pancreas FINE NEEDLE ASPIRATE Cayden Serrano MD 1/16/2025  9:12 AM      Findings:     - Ill defined mass was noted in the pancreatic head with upstream pancreatic duct dilation (up to 7 mm) without evidence of vascular invasion. Fine needle biopsies using a 22 gauge needle showed adequate cellularity and were lesional.   - Enlarged jonathan caval lymph nodes that looked irregular and isoechoic. The lesional nodes were not biopsies as they were not malignant appearing and will be resected if patient undergoes surgery   - Sludge was noted in the gallbladder and common bile duct that had stent in place   - Benign looking lymph nodes (isoechoic) around the hepatic artery (not biopsied).   - No lesions were noted in the liver, left adrenal or mediastinum     Recommendations:   - Observe patient in PACU prior anticipated home discharge   - Clear liquid diet   - Advance diet as tolerated   - Follow pathology results   - If biopsies reveal evidence of cancer, she will need to follow with Surg Onc to be evaluated for potential curative resection and neoadjuvant chemo. If the biopsies show atypical cells or come back as non-diagnostic, she should still be evaluate for surgical resection given high suspicion for  malignancy   - ERCP with metal stent placement was deferred at this point given normal liver enzymes and presence of sludge in the gallbladder   - The findings and recommendations were discussed with the patient.      Complications: None.  Implants: * No implants in log *

## 2025-01-16 NOTE — ANESTHESIA POSTPROCEDURE EVALUATION
Patient: Jany Scherer    Procedure: Procedure(s):  ESOPHAGOGASTRODUODENOSCOPY, WITH FINE NEEDLE ASPIRATION BIOPSY, WITH ENDOSCOPIC ULTRASOUND GUIDANCE       Anesthesia Type:  General    Note:  Disposition: Outpatient   Postop Pain Control: Uneventful            Sign Out: Well controlled pain   PONV: No   Neuro/Psych: Uneventful            Sign Out: Acceptable/Baseline neuro status   Airway/Respiratory: Uneventful            Sign Out: Acceptable/Baseline resp. status   CV/Hemodynamics: Uneventful            Sign Out: Acceptable CV status; No obvious hypovolemia; No obvious fluid overload   Other NRE: NONE   DID A NON-ROUTINE EVENT OCCUR? No           Last vitals:  Vitals Value Taken Time   /74 01/16/25 0945   Temp     Pulse 80 01/16/25 0947   Resp 14 01/16/25 0947   SpO2 100 % 01/16/25 0947   Vitals shown include unfiled device data.    Electronically Signed By: Alex Ogden MD  January 16, 2025  9:47 AM
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
## 2025-01-16 NOTE — ANESTHESIA PROCEDURE NOTES
Airway       Patient location during procedure: OR       Procedure Start/Stop Times: 1/16/2025 8:20 AM  Staff -        Anesthesiologist:  Lolis Moore MD       Resident/Fellow: Sapphire Morris DO       Performed By: resident and with residents       Procedure performed by resident/fellow/CRNA in presence of a teaching physician.  Indications and Patient Condition       Indications for airway management: maximus-procedural       Induction type:intravenous       Mask difficulty assessment: 2 - vent by mask + OA or adjuvant +/- NMBA    Final Airway Details       Final airway type: endotracheal airway       Successful airway: ETT - single and Oral  Endotracheal Airway Details        ETT size (mm): 7.0       Cuffed: yes       Successful intubation technique: direct laryngoscopy       DL Blade Type: MAC 3       Grade View of Cords: 2       Adjucts: stylet       Position: Right       Measured from: lips       Secured at (cm): 22       Bite block used: None    Post intubation assessment        Placement verified by: capnometry, equal breath sounds and chest rise        Number of attempts at approach: 1       Number of other approaches attempted: 0       Secured with: tape       Ease of procedure: easy       Dentition: Lips/oral mucosa injury (vaseline and gauze applied, no dental injuries)    Medication(s) Administered   Medication Administration Time: 1/16/2025 8:20 AM

## 2025-01-16 NOTE — OR NURSING
Dr. Serrano paged - would like to see patient prior to discharge home and will come by as soon as able. Patient made aware.     Dr. Serrano bedside in Phase II. Per Dr. Serrano, patient may resume Eliquis tomorrow (1/17) - patient educated. Patient has met criteria to discharge home.

## 2025-01-16 NOTE — DISCHARGE INSTRUCTIONS
Contacting your Doctor -   To contact a doctor, call Dr. Serrano at the Surgery Clinic @ [phone] or:  [phone] and ask for the resident on call for Gastroenterology (answered 24 hours a day)   Emergency Department:  [facility]: [phone] if you are in need of immediate or emergent help

## 2025-01-16 NOTE — ANESTHESIA CARE TRANSFER NOTE
Patient: Jany Scherer    Procedure: Procedure(s):  ESOPHAGOGASTRODUODENOSCOPY, WITH FINE NEEDLE ASPIRATION BIOPSY, WITH ENDOSCOPIC ULTRASOUND GUIDANCE       Diagnosis: Pancreatic duct stricture [K86.89]  Stricture of bile duct (H) [K83.1]  Elevated CA 19-9 level [R97.8]  Abnormal findings on examination of gastrointestinal tract [R93.3]  Diagnosis Additional Information: No value filed.    Anesthesia Type:   General     Note:    Oropharynx: oropharynx clear of all foreign objects and spontaneously breathing  Level of Consciousness: drowsy  Oxygen Supplementation: face mask  Level of Supplemental Oxygen (L/min / FiO2): 6  Independent Airway: airway patency satisfactory and stable  Dentition: dentition unchanged  Vital Signs Stable: post-procedure vital signs reviewed and stable  Report to RN Given: handoff report given  Patient transferred to: PACU    Handoff Report: Identifed the Patient, Identified the Reponsible Provider, Reviewed the pertinent medical history, Discussed the surgical course, Reviewed Intra-OP anesthesia mangement and issues during anesthesia, Set expectations for post-procedure period and Allowed opportunity for questions and acknowledgement of understanding    Vitals:  Vitals Value Taken Time   /74 01/16/25 0945   Temp     Pulse 71 01/16/25 0946   Resp 8 01/16/25 0946   SpO2 100 % 01/16/25 0946   Vitals shown include unfiled device data.    Electronically Signed By: Sapphire Morris DO  January 16, 2025  9:46 AM

## 2025-01-17 LAB
PATH REPORT.COMMENTS IMP SPEC: ABNORMAL
PATH REPORT.COMMENTS IMP SPEC: ABNORMAL
PATH REPORT.COMMENTS IMP SPEC: YES
PATH REPORT.FINAL DX SPEC: ABNORMAL
PATH REPORT.GROSS SPEC: ABNORMAL
PATH REPORT.MICROSCOPIC SPEC OTHER STN: ABNORMAL
PATH REPORT.RELEVANT HX SPEC: ABNORMAL
UPPER EUS: NORMAL

## 2025-02-03 ENCOUNTER — VIRTUAL VISIT (OUTPATIENT)
Dept: SURGERY | Facility: CLINIC | Age: 80
End: 2025-02-03
Attending: INTERNAL MEDICINE
Payer: MEDICARE

## 2025-02-03 ENCOUNTER — VIRTUAL VISIT (OUTPATIENT)
Dept: GASTROENTEROLOGY | Facility: CLINIC | Age: 80
End: 2025-02-03
Attending: INTERNAL MEDICINE
Payer: MEDICARE

## 2025-02-03 VITALS — WEIGHT: 184 LBS | BODY MASS INDEX: 30.66 KG/M2 | HEIGHT: 65 IN

## 2025-02-03 VITALS — HEIGHT: 65 IN | BODY MASS INDEX: 30.66 KG/M2 | WEIGHT: 184 LBS

## 2025-02-03 DIAGNOSIS — C25.0 MALIGNANT NEOPLASM OF HEAD OF PANCREAS (H): Primary | ICD-10-CM

## 2025-02-03 DIAGNOSIS — K83.1 BILE DUCT STRICTURE (H): ICD-10-CM

## 2025-02-03 DIAGNOSIS — K86.89 PANCREATIC DUCT STRICTURE: ICD-10-CM

## 2025-02-03 DIAGNOSIS — K43.9 VENTRAL HERNIA WITHOUT OBSTRUCTION OR GANGRENE: ICD-10-CM

## 2025-02-03 DIAGNOSIS — C25.9 PANCREATIC ADENOCARCINOMA (H): Primary | ICD-10-CM

## 2025-02-03 DIAGNOSIS — K83.1 STRICTURE OF BILE DUCT (H): ICD-10-CM

## 2025-02-03 DIAGNOSIS — R97.8 ELEVATED CA 19-9 LEVEL: ICD-10-CM

## 2025-02-03 DIAGNOSIS — K43.5 PARASTOMAL HERNIA WITHOUT OBSTRUCTION OR GANGRENE: ICD-10-CM

## 2025-02-03 PROCEDURE — 98004 SYNCH AUDIO-VIDEO EST SF 10: CPT | Performed by: INTERNAL MEDICINE

## 2025-02-03 ASSESSMENT — PAIN SCALES - GENERAL
PAINLEVEL_OUTOF10: NO PAIN (0)
PAINLEVEL_OUTOF10: NO PAIN (0)

## 2025-02-03 NOTE — LETTER
2/3/2025      aJny Scherer  [address]   [address]  [facility] [address]      Dear Colleague,    Thank you for referring your patient, Jany Scherer, to the [facility]. Please see a copy of my visit note below.    Virtual Visit Details    Type of service:  Video Visit   Video Start Time: 11:06 AM  Video End Time:11:37 AM    Originating Location (pt. Location): Home    Distant Location (provider location):  On-site  Platform used for Video Visit: Jones    Ms. Rabago is a 79-year-old female recently diagnosed with pancreatic adenocarcinoma.  She was initially seen in fall of last year.  She had double duct obstruction.  There was a bit of a delay in getting her evaluation completed but she has had a recent endoscopic ultrasound and biopsies which now confirmed the presence of adenocarcinoma the pancreas without vascular involvement.  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  On her most recent imaging it seems as though that has resolved.  On imaging there is really no visible lesion in the pancreas on CT scan but it was confirmed on EUS.  Of note this patient has had a sigmoid colectomy due to perforated diverticulitis which had fistulized to the uterus.  She also has a history of ulcerative colitis although she did not have a total colectomy.  She currently has a left lower quadrant end ileostomy which has a massive parastomal hernia which contains numerous loops of bowel.  In addition there is a second large hernia in the patient's upper abdomen towards the right side which also contains a large amount of bowel.    The patient is a retired  and considers herself fairly active.  She did have a broken ankle in the past which limited her mobility and requires the use of a cane but she otherwise considers herself without limitations.    I had a long discussion with the patient and her   today regarding the implications of newly diagnosed pancreatic adenocarcinoma.  I discussed the need for upfront chemotherapy to be started as quickly as possible.  I discussed some of the details of a planned Whipple procedure as well as long-term risks such as changes to diet habits and bowel habits as well as potential for prolonged recovery etc.  I also discussed that during upfront chemotherapy there is approximately 15% chance that disease turns up in her body that would prevent surgical resection in the future.  We will plan for restaging and repeat CA 19-9 in 3 months time to assess her response.  Her current CA 19-9 level was slightly over 300.      I focused a lot of the discussion today also on the complicated nature of her abdominal wall which I am not certain we will be able to be reconstructed.  When I see her in 3 months after 6 cycles of chemotherapy, we will also have her meet with our general surgery colleagues with expertise in abdominal wall reconstruction.  I will need their assistance in this case to optimize her care plan.    The patient seems to have a clear understanding of her situation although all of this news is distressing of course.  She will be meeting with medical oncology in the very near future.  I will plan to see her again after 6 cycles of chemotherapy with a repeat CT chest abdomen pelvis and a visit with our abdominal wall reconstruction experts.    37 minutes was spent in face-to-face time today.  15 minutes were spent in review of history, imaging, coordination of care.  5 minutes were spent documentation.      Again, thank you for allowing me to participate in the care of your patient.        Sincerely,        Fernando Arroyo MD    Electronically signed

## 2025-02-03 NOTE — PROGRESS NOTES
Virtual Visit Details    Type of service:  Video Visit   Video Start Time: 11:06 AM  Video End Time:11:37 AM    Originating Location (pt. Location): Home    Distant Location (provider location):  On-site  Platform used for Video Visit: Jones    Ms. Rabago is a 79-year-old female recently diagnosed with pancreatic adenocarcinoma.  She was initially seen in fall of last year.  She had double duct obstruction.  There was a bit of a delay in getting her evaluation completed but she has had a recent endoscopic ultrasound and biopsies which now confirmed the presence of adenocarcinoma the pancreas without vascular involvement.  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  On her most recent imaging it seems as though that has resolved.  On imaging there is really no visible lesion in the pancreas on CT scan but it was confirmed on EUS.  Of note this patient has had a sigmoid colectomy due to perforated diverticulitis which had fistulized to the uterus.  She also has a history of ulcerative colitis although she did not have a total colectomy.  She currently has a left lower quadrant end ileostomy which has a massive parastomal hernia which contains numerous loops of bowel.  In addition there is a second large hernia in the patient's upper abdomen towards the right side which also contains a large amount of bowel.    The patient is a retired  and considers herself fairly active.  She did have a broken ankle in the past which limited her mobility and requires the use of a cane but she otherwise considers herself without limitations.    I had a long discussion with the patient and her  today regarding the implications of newly diagnosed pancreatic adenocarcinoma.  I discussed the need for upfront chemotherapy to be started as quickly as possible.  I discussed some of the details of a planned Whipple procedure as well as  long-term risks such as changes to diet habits and bowel habits as well as potential for prolonged recovery etc.  I also discussed that during upfront chemotherapy there is approximately 15% chance that disease turns up in her body that would prevent surgical resection in the future.  We will plan for restaging and repeat CA 19-9 in 3 months time to assess her response.  Her current CA 19-9 level was slightly over 300.      I focused a lot of the discussion today also on the complicated nature of her abdominal wall which I am not certain we will be able to be reconstructed.  When I see her in 3 months after 6 cycles of chemotherapy, we will also have her meet with our general surgery colleagues with expertise in abdominal wall reconstruction.  I will need their assistance in this case to optimize her care plan.    The patient seems to have a clear understanding of her situation although all of this news is distressing of course.  She will be meeting with medical oncology in the very near future.  I will plan to see her again after 6 cycles of chemotherapy with a repeat CT chest abdomen pelvis and a visit with our abdominal wall reconstruction experts.    37 minutes was spent in face-to-face time today.  15 minutes were spent in review of history, imaging, coordination of care.  5 minutes were spent documentation.

## 2025-02-03 NOTE — PROGRESS NOTES
"Virtual Visit Details    Type of service:  Video Visit   Video Start Time: {video visit start/end time for provider to select:012989}  Video End Time:{video visit start/end time for provider to select:753401}    Originating Location (pt. Location): {video visit patient location:952972::\"Home\"}  {PROVIDER LOCATION On-site should be selected for visits conducted from your clinic location or adjoining [facility] hospital, academic office, or other nearby [facility] building. Off-site should be selected for all other provider locations, including home:645802}  Distant Location (provider location):  {virtual location provider:212295}  Platform used for Video Visit: {Virtual Visit Platforms:682740::\"[employer]\"}    "

## 2025-02-03 NOTE — NURSING NOTE
Current patient location: [address] [place] [address] [address] [facility] [address]    Is the patient currently in the state of [place]? YES    Visit mode: VIDEO    If the visit is dropped, the patient can be reconnected by:VIDEO VISIT: Text to cell phone:   Telephone Information:   Mobile [phone]       Will anyone else be joining the visit? Yes  (If patient encounters technical issues they should call [phone])    Are changes needed to the allergy or medication list? Pt completed echeck-in an confirms medications and allergies are correct.      Are refills needed on medications prescribed by this physician? NO    Rooming Documentation:  Questionnaire(s) not done per department protocol    Reason for visit: Consult    Black MARIE

## 2025-02-03 NOTE — LETTER
2/3/2025      Jany Scherer  [address]  [address]  [place] [address]      Dear Colleague,    Thank you for referring your patient, Jany Scherer, to the [facility]. Please see a copy of my visit note below.        Is the patient currently in the state of [place]? YES    Visit mode: VIDEO    If the visit is dropped, the patient can be reconnected by:VIDEO VISIT: Send to e-mail at: [email or web address]    Will anyone else be joining the visit? NO  (If patient encounters technical issues they should call [phone])    Are changes needed to the allergy or medication list? No    Are refills needed on medications prescribed by this physician? NO    Rooming Documentation:  Questionnaire(s) completed    Reason for visit: Video Visit (New apt )    Cynthia Suresh VVLEYDA       Virtual Visit Details    Type of service:  Video Visit   Video Start Time: 9:20  Video End Time:9:37    Originating Location (pt. Location): Home    Distant Location (provider location):  On-site  Platform used for Video Visit: Jones      Pt being seen for follow-up after recent EUS for evaluation of a biliary stricture and suspicion for pancreatic neoplasm.    Pt seen in the presence of her spouse.    We reviewed the final biopsy results confirming the clinical suspicion of malignancy. We reviewed the typical approach to management at our institution comprising neoadjuvant chemotherapy followed by surgical resection. This will be discussed further at her surgical oncology visit later today.  A medical oncology consultation visit is being coordinated by that clinic.    We discussed the recommendation to change her plastic biiliary stent for a metal stent prior to chemotherapy.     JOSEPH Serrano MD  Professor of Medicine  Division of Gastroenterology, Hepatology and Nutrition  [facility]        Again, thank you for allowing me to participate in the care of your patient.        Sincerely,        Cayden  Bobby Serrano MD    Electronically signed

## 2025-02-03 NOTE — PROGRESS NOTES
Is the patient currently in the state of [place]? YES    Visit mode: VIDEO    If the visit is dropped, the patient can be reconnected by:VIDEO VISIT: Send to e-mail at: [email or web address]    Will anyone else be joining the visit? NO  (If patient encounters technical issues they should call [phone])    Are changes needed to the allergy or medication list? No    Are refills needed on medications prescribed by this physician? NO    Rooming Documentation:  Questionnaire(s) completed    Reason for visit: Video Visit (New apt )    Cynthia MARIE

## 2025-02-04 ENCOUNTER — MYC MEDICAL ADVICE (OUTPATIENT)
Dept: GASTROENTEROLOGY | Facility: CLINIC | Age: 80
End: 2025-02-04
Payer: MEDICARE

## 2025-02-04 ENCOUNTER — PATIENT OUTREACH (OUTPATIENT)
Dept: ONCOLOGY | Facility: CLINIC | Age: 80
End: 2025-02-04
Payer: MEDICARE

## 2025-02-04 NOTE — PROGRESS NOTES
New Patient Oncology Nurse Navigator Note     Referring provider: Dr Arroyo, Surg Onc    Referring Clinic/Organization: [facility]     Referred to: Medical Oncology    Requested provider (if applicable): First available - did not specify     Referral Received: 02/03/25       Evaluation for : pancreatic adenocarcinoma     Clinical History (per Nurse review of records provided):      1/2/2025 CT CAP ([facility])  IMPRESSION:  1.  Interval improvement of pancreatitis changes since 10/11/2024. No  new pancreas fluid collection or complication by imaging.   2.  Extrahepatic biliary stent in place. Mild biliary ductal  dilatation with pneumobilia noted. Mild wall enhancement of the  biliary tree could be seen with a cholangitis. Dilated main pancreatic  duct again noted leading to the pancreas head region where it is  decompressed. However, no visible lesion can be seen. Recommend  surveillance imaging for follow-up.  3.  A few borderline prominent peripancreatic lymph nodes are stable.  4.  Stable size of large ventral abdominal wall hernias containing  bowel without bowel obstruction.  5.  No acute abnormality of the chest.      1/16/2025 EUS ([facility])  Impression:     - Ill defined mass was noted in the pancreatic head                          with upstream pancreatic duct dilation (up to 7 mm)                          without evidence of vascular invasion. Fine needle                          biopsies using a 22 gauge needle showed adequate                          cellularity and were lesional.                          - Enlarged jonathan caval and hepatic artery lymph nodes                          that were irregular and isoechoic. These nodes were                          not biopsies as they were not malignant appearing and                          will be resected if patient undergoes surgery. No                          non-regional nodes were seen.                          - Sludge was noted in the  gallbladder and common bile                          duct that had stent in place. LFTs are normal. ERCP                          was not performed as this was not required to                          adequately assess the mass.                          - Benign looking lymph nodes (isoechoic) around the                          hepatic artery (not biopsied).                          - No lesions were noted in the liver, left adrenal or                          mediastinum       Final Diagnosis   Specimen A. 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      Clinical Assessment / Barriers to Care (Per Nurse):    Will proceed to schedule next available GI Med Onc for consult. Dr Gonzalez is first available 2/10/25, or schedule per pt preference.        Records Location: [facility] Everywhere     Records Needed:     Outside imaging.    Additional testing needed prior to consult:     N/A        Martin Lindquist RN, BSN, OCN  Oncology New Patient Nurse Navigator   [facility]  [phone]

## 2025-02-04 NOTE — TELEPHONE ENCOUNTER
Contacted pt to answer her question about med onc referral and if she should be seen at [facility] cancer clinic closer to home. Ultimately she would like to have [facility] med onc consult and trusts the care she has received within [facility]. Will discuss at appt options for infusion that are closer to home if possible.     Elisabeth Valentin, RN, BSN,   [facility]  Care coordinator

## 2025-02-04 NOTE — PATIENT INSTRUCTIONS
Please see below for any additional questions and scheduling guidelines.    Sign up for "[employer]": "[employer] patient portal serves as a secure platform for accessing your medical records from the [facility]. Additionally, "[employer] facilitates easy, timely, and secure messaging with your care team. If you have not signed up, you may do so by using the provided code or calling [phone].    Coordinating your care after your visit:  There are multiple options for scheduling your follow-up care based on your provider's recommendation.    How do I schedule a follow-up clinic appointment:   After your appointment, you may receive scheduling assistance with the Clinic Coordinators by having a seat in the waiting room and a Clinic Coordinator will call you up to schedule.  Virtual visits or after you leave the clinic:  Your provider has placed a follow-up order in the "[employer] portal for scheduling your return appointment. A member of the scheduling team will contact you to schedule.  "[employer] Scheduling: Timely scheduling through "[employer] is advised to ensure appointment availability.   Call to schedule: You may schedule your follow-up appointment(s) by calling [phone], option 1.    How do I schedule my endoscopy or colonoscopy procedure:  If a procedure, such as a colonoscopy or upper endoscopy was ordered by your provider, the scheduling team will contact you to schedule this procedure. Or you may choose to call to schedule at   [phone], option 2.  Please allow 20-30 minutes when scheduling a procedure.    How do I get my blood work done? To get your blood work done, you need to schedule a lab appointment at an [facility]. There are multiple ways to schedule:   At the clinic: The Clinic Coordinator you meet after your visit can help you schedule a lab appointment.   [employer] scheduling: "[employer] offers online lab scheduling at all [facility] laboratory locations.   Call to  schedule: You can call [phone] to schedule your lab appointment.    How do I schedule my imaging study: To schedule imaging studies, such as CT scans, ultrasounds, MRIs, or X-rays, contact Imaging Services at [phone].    How do I schedule a referral to another doctor: If your provider recommended a referral to another specialist(s), the referral order was placed by your provider. You will receive a phone call to schedule this referral, or you may choose to call the number attached to the referral to self-schedule.    For Post-Visit Question(s):  For any inquiries following today's visit:  Please utilize [employer] messaging and allow 48 hours for reply or contact the Call Center during normal business hours at [phone], option 3.  For Emergent After-hours questions, contact the On-Call GI Fellow through the [facility]  at [phone].  In addition, you may contact your Nurse directly using the provided contact information.    Test Results:  Test results will be accessible via [employer] in compliance with the 21st Century Cures Act. This means that your results will be available to you at the same time as your provider. Often you may see your results before your provider does. Results are reviewed by staff within two weeks with communication follow-up. Results may be released in the patient portal prior to your care team review.    Prescription Refill(s):  Medication prescribed by your provider will be addressed during your visit. For future refills, please coordinate with your pharmacy. If you have not had a recent clinic visit or routine labs, for your safety, your provider may not be able to refill your prescription.           Contacts post-consultation depending on your need:     Schedule Clinic Appointments                        [phone], option 1    Sangeeta Valentin RN Care Coordinator           [phone]     Alexandra Faustin, [place]                           [phone]      GI Procedure Scheduling                               [phone], option 2     For urgent/emergent questions after business hours, you may reach the on-call GI Fellow by contacting the [facility]  at [phone].

## 2025-02-05 NOTE — TELEPHONE ENCOUNTER
RECORDS STATUS - ALL OTHER DIAGNOSIS      RECORDS RECEIVED FROM: [facility]   DATE RECEIVED: 2/5

## 2025-02-10 ENCOUNTER — PRE VISIT (OUTPATIENT)
Dept: ONCOLOGY | Facility: CLINIC | Age: 80
End: 2025-02-10
Payer: MEDICARE

## 2025-02-10 ENCOUNTER — VIRTUAL VISIT (OUTPATIENT)
Dept: ONCOLOGY | Facility: CLINIC | Age: 80
End: 2025-02-10
Attending: SURGERY
Payer: MEDICARE

## 2025-02-10 VITALS — HEIGHT: 64 IN | BODY MASS INDEX: 31.58 KG/M2 | WEIGHT: 185 LBS

## 2025-02-10 DIAGNOSIS — C25.0 MALIGNANT NEOPLASM OF HEAD OF PANCREAS (H): ICD-10-CM

## 2025-02-10 PROCEDURE — 98003 SYNCH AUDIO-VIDEO NEW HI 60: CPT | Performed by: INTERNAL MEDICINE

## 2025-02-10 ASSESSMENT — PAIN SCALES - GENERAL: PAINLEVEL_OUTOF10: NO PAIN (0)
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
## 2025-02-10 NOTE — NURSING NOTE
Current patient location: [address] [place], [place] [address]     [facility] [address]    Is the patient currently in the state of [place]? YES    Visit mode: VIDEO    If the visit is dropped, the patient can be reconnected by:VIDEO VISIT: Send to e-mail at: [email or web address]    Will anyone else be joining the visit? NO  (If patient encounters technical issues they should call [phone]    Are changes needed to the allergy or medication list? No    Are refills needed on medications prescribed by this physician? NO    Rooming Documentation:  Questionnaire(s) not done per department protocol    Reason for visit: Consult    Tanesha MARIE
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
## 2025-02-24 ENCOUNTER — PATIENT OUTREACH (OUTPATIENT)
Dept: GASTROENTEROLOGY | Facility: CLINIC | Age: 80
End: 2025-02-24
Payer: MEDICARE

## 2025-02-24 NOTE — TELEPHONE ENCOUNTER
"Called patient to confirm outpatient plan.     \"Looks like she just had a new plastic stent placed 2/13. I would still recommend repeat ERCP for placement of a metal stent prior to starting chemo. \"      Discussed plan for follow with [facility] for oncology and ERCP.     ML  "

## 2025-02-25 ENCOUNTER — PATIENT OUTREACH (OUTPATIENT)
Dept: ONCOLOGY | Facility: CLINIC | Age: 80
End: 2025-02-25
Payer: MEDICARE

## 2025-02-25 NOTE — PROGRESS NOTES
Outgoing call                               Discussion with Patient/Care Team:                                                      Called and left a message for patient to check in on how treatment is going and confirm dates of treatment plan. Provided my call back number for patient to return call.     Returned call to patient and spoke with her regarding treatment schedule . She confirmed she had a PET scan today and is scheduled to started chemo on Monday.     Informed her I would plan to continue to monitor her treatment and arrange for a follow up with Dr. Arroyo around the [place] for a check in. She is aware we will reach out in about a month to schedule follow up pending treatment schedule.       Follow up/Plan                                                       Pending treatment status.     Rosmery Monroe, RN, BSN  Surgical Oncology and Hepatobiliary Surgery

## 2025-03-23 ENCOUNTER — HEALTH MAINTENANCE LETTER (OUTPATIENT)
Age: 80
End: 2025-03-23

## 2025-04-08 ENCOUNTER — PATIENT OUTREACH (OUTPATIENT)
Dept: SURGERY | Facility: CLINIC | Age: 80
End: 2025-04-08
Payer: MEDICARE

## 2025-04-08 DIAGNOSIS — K43.5 PARASTOMAL HERNIA WITHOUT OBSTRUCTION OR GANGRENE: ICD-10-CM

## 2025-04-08 DIAGNOSIS — K43.9 VENTRAL HERNIA: ICD-10-CM

## 2025-04-08 NOTE — PROGRESS NOTES
Outgoing call                               Discussion with Patient/Care Team:                                                      Returned call to patient who had left a previous message about timing of restaging CT scan. Per patient she is scheduled for scan on 6/10/25 and her oncology team would like her to see us after this scan. Informed her we can arrange this visit and that we would also need to arrange for her to see the general surgery team to discuss possible hernia repair at the time of her pancreas surgery.     Informed her we will place a referral to general surgery now and that she should establish with them sooner than later so that when its time to schedule her surgery pending response an chemo schedule, we can coordinate with their team. Informed her they will reach out to schedule and that it will likely need to be in person so they can do a physical exam of hernia. She verbalized understanding and agrees with plan. She is aware scheduling teams will be in touch.     Follow up/Plan                                                       With Dr. Arroyo after restaging imaging in June.     Rosmery Monroe, RN, BSN  Surgical Oncology and Hepatobiliary Surgery

## 2025-04-28 ENCOUNTER — OFFICE VISIT (OUTPATIENT)
Dept: SURGERY | Facility: CLINIC | Age: 80
End: 2025-04-28
Attending: SURGERY
Payer: MEDICARE

## 2025-04-28 VITALS
BODY MASS INDEX: 31.28 KG/M2 | WEIGHT: 183.2 LBS | OXYGEN SATURATION: 99 % | DIASTOLIC BLOOD PRESSURE: 76 MMHG | SYSTOLIC BLOOD PRESSURE: 150 MMHG | HEIGHT: 64 IN | HEART RATE: 95 BPM

## 2025-04-28 DIAGNOSIS — K43.9 VENTRAL HERNIA WITHOUT OBSTRUCTION OR GANGRENE: Primary | ICD-10-CM

## 2025-04-28 PROCEDURE — 3078F DIAST BP <80 MM HG: CPT | Performed by: SURGERY

## 2025-04-28 PROCEDURE — 3077F SYST BP >= 140 MM HG: CPT | Performed by: SURGERY

## 2025-04-28 PROCEDURE — 99203 OFFICE O/P NEW LOW 30 MIN: CPT | Performed by: SURGERY

## 2025-04-28 PROCEDURE — 1126F AMNT PAIN NOTED NONE PRSNT: CPT | Performed by: SURGERY

## 2025-04-28 RX ORDER — PROCHLORPERAZINE MALEATE 10 MG
10 TABLET ORAL
COMMUNITY
Start: 2025-02-20

## 2025-04-28 RX ORDER — CEPHALEXIN 500 MG/1
500 CAPSULE ORAL
COMMUNITY
Start: 2025-04-22

## 2025-04-28 RX ORDER — GABAPENTIN 300 MG/1
300 CAPSULE ORAL
COMMUNITY
Start: 2025-02-20 | End: 2026-02-20

## 2025-04-28 ASSESSMENT — PAIN SCALES - GENERAL: PAINLEVEL_OUTOF10: NO PAIN (0)

## 2025-04-28 NOTE — NURSING NOTE
"Chief Complaint   Patient presents with    New Patient     Ventral hernia       Vitals:    04/28/25 1217   BP: (!) 150/76   BP Location: Left arm   Patient Position: Sitting   Cuff Size: Adult Regular   Pulse: 95   SpO2: 99%   Weight: 83.1 kg (183 lb 3.2 oz)   Height: 1.626 m (5' 4\")       Body mass index is 31.45 kg/m .                          West Black, EMT    "

## 2025-04-28 NOTE — PROGRESS NOTES
"I met with Jany Scherer and her  to discuss management of her known incisional and parastomal hernias during possible future laparotomy and pancreatic head resection for adenocarcinoma.    She is 10 years out from colectomy and stoma formation for diverticulitis.  Reports a history of ulcerative colitis as well.  Prior Csection and appendectomy in past.    Is currently undergoing neoadjuvant chemotherapy (gemzar, abraxane) with plans for repeat imaging this summer.  Currently thought to have a resectable tumor.    She reports one obstructive episode from the parastomal hernia- managed with an NG tube.  The midline hernia is more recent, noticed in the past few months.     Nonsmoker  Denies diabetes  Has stoma, no straining to void, no chronic cough  On chemo  Losing weight currently    Exam  BP (!) 150/76 (BP Location: Left arm, Patient Position: Sitting, Cuff Size: Adult Regular)   Pulse 95   Ht 1.626 m (5' 4\")   Wt 83.1 kg (183 lb 3.2 oz)   SpO2 99%   BMI 31.45 kg/m    Alert, pleasant  No resp distress  Abd is soft.  Large parastomal hernia bulge- unable to reduce this. No overlying skin changes  In her upper midline is a reducible hernia with a 5cm circular defect palpable.    I reviewed a CT scan from this winter showing her hernias.    A/P  Pancreatic adenocarcinoma with known incisional and parastomal defects.  Should she undergo surgery for this tumor- I will plan to be available to assist in closing.  While the actual closure will depend on the case that day- my expected plan would be to leave the parastomal hernia along and then do an interrupted suture repair of the midline incision with possible absorbable mesh re-enforcement in an onlay fashion with a subcutaneous drain for seroma management.    I discussed with her this possibility, and that my goal would be to help her heal from surgery without wound complications- and not so much on a durable hernia repair.      All questions answered.  " I will await the results of her post chemo CT and surgical planning.

## 2025-04-28 NOTE — PATIENT INSTRUCTIONS
You met with Dr. Joni Sánchez.      Today's visit instructions:    Please contact our office when you would like to schedule your hernia repair or when your surgery is schedule with Dr. Arroyo.     If you have questions please contact Velma RN or Kim RN during regular clinic hours, Monday through Friday 7:30 AM - 4:00 PM, or you can contact us via GREE at anytime.       If you have urgent needs after-hours, weekends, or holidays please call the hospital at [phone] and ask to speak with our on-call General Surgery Team.    Appointment [phone]  Nurse Advice (Velma or Kim): [phone]   Surgery Scheduler (Laura): [phone]  Billing Customer Service:  [phone]  Fax: [phone]

## 2025-04-28 NOTE — LETTER
"4/28/2025       RE: Jany Scherer  [id number]  [address]  [facility] [address]     Dear Colleague,    Thank you for referring your patient, Jany Scherer, to the [facility] [place] [facility]. Please see a copy of my visit note below.    I met with Jany Scherer and her  to discuss management of her known incisional and parastomal hernias during possible future laparotomy and pancreatic head resection for adenocarcinoma.    She is 10 years out from colectomy and stoma formation for diverticulitis.  Reports a history of ulcerative colitis as well.  Prior Csection and appendectomy in past.    Is currently undergoing neoadjuvant chemotherapy (gemzar, abraxane) with plans for repeat imaging this summer.  Currently thought to have a resectable tumor.    She reports one obstructive episode from the parastomal hernia- managed with an NG tube.  The midline hernia is more recent, noticed in the past few months.     Nonsmoker  Denies diabetes  Has stoma, no straining to void, no chronic cough  On chemo  Losing weight currently    Exam  BP (!) 150/76 (BP Location: Left arm, Patient Position: Sitting, Cuff Size: Adult Regular)   Pulse 95   Ht 1.626 m (5' 4\")   Wt 83.1 kg (183 lb 3.2 oz)   SpO2 99%   BMI 31.45 kg/m    Alert, pleasant  No resp distress  Abd is soft.  Large parastomal hernia bulge- unable to reduce this. No overlying skin changes  In her upper midline is a reducible hernia with a 5cm circular defect palpable.    I reviewed a CT scan from this winter showing her hernias.    A/P  Pancreatic adenocarcinoma with known incisional and parastomal defects.  Should she undergo surgery for this tumor- I will plan to be available to assist in closing.  While the actual closure will depend on the case that day- my expected plan would be to leave the parastomal hernia along and then do an interrupted suture repair of the " midline incision with possible absorbable mesh re-enforcement in an onlay fashion with a subcutaneous drain for seroma management.    I discussed with her this possibility, and that my goal would be to help her heal from surgery without wound complications- and not so much on a durable hernia repair.      All questions answered.  I will await the results of her post chemo CT and surgical planning.      Again, thank you for allowing me to participate in the care of your patient.      Sincerely,    Joni Sánchez MD
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
## 2025-05-12 ENCOUNTER — VIRTUAL VISIT (OUTPATIENT)
Dept: ONCOLOGY | Facility: CLINIC | Age: 80
End: 2025-05-12
Attending: INTERNAL MEDICINE
Payer: MEDICARE

## 2025-05-12 VITALS — HEIGHT: 66 IN | WEIGHT: 182 LBS | BODY MASS INDEX: 29.25 KG/M2

## 2025-05-12 DIAGNOSIS — L65.9 ALOPECIA: ICD-10-CM

## 2025-05-12 DIAGNOSIS — T45.1X5A CHEMOTHERAPY-INDUCED NEUROPATHY: ICD-10-CM

## 2025-05-12 DIAGNOSIS — G62.0 CHEMOTHERAPY-INDUCED NEUROPATHY: ICD-10-CM

## 2025-05-12 DIAGNOSIS — C25.0 MALIGNANT NEOPLASM OF HEAD OF PANCREAS (H): Primary | ICD-10-CM

## 2025-05-12 PROCEDURE — 1126F AMNT PAIN NOTED NONE PRSNT: CPT | Mod: 95 | Performed by: INTERNAL MEDICINE

## 2025-05-12 PROCEDURE — 98006 SYNCH AUDIO-VIDEO EST MOD 30: CPT | Performed by: INTERNAL MEDICINE

## 2025-05-12 ASSESSMENT — PAIN SCALES - GENERAL: PAINLEVEL_OUTOF10: NO PAIN (0)

## 2025-05-12 NOTE — NURSING NOTE
Patient confirms medications and allergies are accurate via patients echeck in completion, and or denies any changes since last reviewed/verified.       Current patient location: Hospitals in [place] getting labs completed.     Is the patient currently in the state of [place]? YES    Visit mode: VIDEO    If the visit is dropped, the patient can be reconnected by:VIDEO VISIT: Text to cell phone:   Telephone Information:   Mobile [phone]       Will anyone else be joining the visit?  Yolanda  (If patient encounters technical issues they should call [phone])    Are changes needed to the allergy or medication list? No    Are refills needed on medications prescribed by this physician? NO    Rooming Documentation:  Unable to complete questionnaire(s) due to time    Reason for visit: RECHECK    Kimberli BEEF
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
## 2025-05-12 NOTE — PROGRESS NOTES
Virtual Visit Details    Type of service:  Video Visit     Originating Location (pt. Location): Home    Distant Location (provider location):  On-site  Platform used for Video Visit: Jones

## 2025-06-23 ENCOUNTER — ONCOLOGY VISIT (OUTPATIENT)
Dept: SURGERY | Facility: CLINIC | Age: 80
End: 2025-06-23
Attending: SURGERY
Payer: MEDICARE

## 2025-06-23 VITALS
WEIGHT: 174.3 LBS | OXYGEN SATURATION: 96 % | SYSTOLIC BLOOD PRESSURE: 114 MMHG | DIASTOLIC BLOOD PRESSURE: 74 MMHG | RESPIRATION RATE: 16 BRPM | BODY MASS INDEX: 28.13 KG/M2 | TEMPERATURE: 97.5 F | HEART RATE: 58 BPM

## 2025-06-23 DIAGNOSIS — C25.0 MALIGNANT NEOPLASM OF HEAD OF PANCREAS (H): Primary | ICD-10-CM

## 2025-06-23 PROCEDURE — G0463 HOSPITAL OUTPT CLINIC VISIT: HCPCS | Performed by: SURGERY

## 2025-06-23 PROCEDURE — 99215 OFFICE O/P EST HI 40 MIN: CPT | Performed by: SURGERY

## 2025-06-23 PROCEDURE — 99417 PROLNG OP E/M EACH 15 MIN: CPT | Performed by: SURGERY

## 2025-06-23 ASSESSMENT — PAIN SCALES - GENERAL: PAINLEVEL_OUTOF10: SEVERE PAIN (8)

## 2025-06-23 NOTE — NURSING NOTE
"Oncology Rooming Note    June 23, 2025 1:30 PM   Jany Scherer is a 79 year old female who presents for:    Chief Complaint   Patient presents with    Oncology Clinic Visit     Malignant neoplasm of head of pancreas     Initial Vitals: There were no vitals taken for this visit. Estimated body mass index is 29.38 kg/m  as calculated from the following:    Height as of 5/12/25: 1.676 m (5' 6\").    Weight as of 5/12/25: 82.6 kg (182 lb). There is no height or weight on file to calculate BSA.  Severe Pain (8) Comment: 8 at night; 2-3 during the day   No LMP recorded. Patient has had a hysterectomy.  Allergies reviewed: Yes  Medications reviewed: Yes    Medications: Medication refills not needed today.  Pharmacy name entered into [employer]: [id number] - [place], [place] - [address] [place] [address]    Frailty Screening:   Is the patient here for a new oncology consult visit in cancer care? 2. No    PHQ9:  Did this patient require a PHQ9?: No      Clinical concerns: none       Diann Alexander"

## 2025-06-23 NOTE — PROGRESS NOTES
Ms. Rabago presents with her  today to discuss management of her localized pancreatic adenocarcinoma.  She has finished 8 cycles of gemcitabine and Abraxane with Dr. Chambers.  She presents today for discussion regarding moving forward with surgical resection.  She has a very large ventral and parastomal hernia which contains essentially the majority of her bowels and has met with Joni Sánchez, one of my partners to discuss complex repair.    With regard to her planned Whipple procedure, I discussed with the patient and her  that my main concern is her overall physical deconditioning although she looks reasonably well today.  She ambulates with a cane and occasionally a walker because she has been having balance problems that have gone along with her chemotherapy.  She will be starting physical therapy this week.    I had a long discussion about the details of the planned Whipple procedure.  I reviewed the [facility] teaching sheet in detail.  I specifically discussed risks including bleeding, infection, anastomotic leak, delayed gastric emptying, diabetes, pancreatic insufficiency, venous thromboembolism, prolonged recovery including requiring nursing home or TCU placement, permanent changes to diet and bowel habits, recurrent hernias, death from surgery.  My main concern for her is the very large hernias which may complicate our resection and reconstruction.  I did also discussed the risk that we cannot reconstruct her if she has significant adhesions and we cannot get enough small bowel free.  I also discussed in a lot of detail the risk of prolonged recovery and inability to go home after discharge.  I think her strength is borderline but I do think it is reasonable to proceed with surgery in an effort to provide her the best care possible.    We will start looking for dates for surgery about 6 weeks from now given that she has had her last chemotherapy last week.  This will give her some  time to continue with physical therapy and get her strength as good as it can be prior to moving forward with surgery.  The patient is very much in favor of surgical resection and understands the risks noted above.    We will make plans to get her set up for an open Whipple procedure and complex abdominal wall closure with Dr. Sánchez in the coming month.    An hour was spent on this visit today.  More than half the time was in face-to-face time and the remainder was in review of history, imaging, coordination of care, documentation.

## 2025-06-23 NOTE — LETTER
6/23/2025      Jany Scherer  [id number]   [address] [place] [address]      Dear Colleague,    Thank you for referring your patient, Jany Scherer, to the [facility]. Please see a copy of my visit note below.    Ms. Rabago presents with her  today to discuss management of her localized pancreatic adenocarcinoma.  She has finished 8 cycles of gemcitabine and Abraxane with Dr. Chambers.  She presents today for discussion regarding moving forward with surgical resection.  She has a very large ventral and parastomal hernia which contains essentially the majority of her bowels and has met with Joni Sánchez, one of my partners to discuss complex repair.    With regard to her planned Whipple procedure, I discussed with the patient and her  that my main concern is her overall physical deconditioning although she looks reasonably well today.  She ambulates with a cane and occasionally a walker because she has been having balance problems that have gone along with her chemotherapy.  She will be starting physical therapy this week.    I had a long discussion about the details of the planned Whipple procedure.  I reviewed the [facility] teaching sheet in detail.  I specifically discussed risks including bleeding, infection, anastomotic leak, delayed gastric emptying, diabetes, pancreatic insufficiency, venous thromboembolism, prolonged recovery including requiring nursing home or TCU placement, permanent changes to diet and bowel habits, recurrent hernias, death from surgery.  My main concern for her is the very large hernias which may complicate our resection and reconstruction.  I did also discussed the risk that we cannot reconstruct her if she has significant adhesions and we cannot get enough small bowel free.  I also discussed in a lot of detail the risk of prolonged recovery and inability to go home after discharge.  I think her strength is borderline but I do  think it is reasonable to proceed with surgery in an effort to provide her the best care possible.    We will start looking for dates for surgery about 6 weeks from now given that she has had her last chemotherapy last week.  This will give her some time to continue with physical therapy and get her strength as good as it can be prior to moving forward with surgery.  The patient is very much in favor of surgical resection and understands the risks noted above.    We will make plans to get her set up for an open Whipple procedure and complex abdominal wall closure with Dr. Sánchez in the coming month.    An hour was spent on this visit today.  More than half the time was in face-to-face time and the remainder was in review of history, imaging, coordination of care, documentation.    Again, thank you for allowing me to participate in the care of your patient.        Sincerely,        Fernando Arroyo MD    Electronically signed

## 2025-06-24 ENCOUNTER — PREP FOR PROCEDURE (OUTPATIENT)
Dept: ONCOLOGY | Facility: CLINIC | Age: 80
End: 2025-06-24
Payer: MEDICARE

## 2025-06-24 ENCOUNTER — PATIENT OUTREACH (OUTPATIENT)
Dept: SURGERY | Facility: CLINIC | Age: 80
End: 2025-06-24
Payer: MEDICARE

## 2025-06-24 DIAGNOSIS — C25.0 MALIGNANT NEOPLASM OF HEAD OF PANCREAS (H): Primary | ICD-10-CM

## 2025-06-24 NOTE — PROGRESS NOTES
[facility]: Cancer Care Follow-Up Note                                    Discussion with Patient:                                                      Pre surgery teaching completed with patient. See assessment for details.     Surgery packet:     Will be mailed by surgery scheduler.    Reviewed timing, pre op testing and clearance as well as information for post surgery. Informed her we will send her some shower instructions along with additional post surgery information to review prior to surgery. Encouraged her to review and call with any questions.     Also discussed adding nutrition or protein shakes to diet between now and surgery date. Discussed increased activity and protein benefits for overall recovery.         Dates of Treatment:                                                      Patient gets chemo at outside facility - last chemo infusion 6/16/2025.     Called oncology clinic and left a message updating Dr. Melvin's team regarding plan to proceed with surgery and tentative timing of surgery date. Informed their office that we will be setting up new scans and labs close there surgery date as well.     Provided my direct line for any questions form clinic.       Assessment:                                                      Pre/Post Procedure:   Surgery/Procedure plan reviewed with patient  Planned Surgery or Procedure: Open Whipple  Anesthesia Type: general anesthesia  Relevant Diagnosis: Pancreas cancer  Preoperative Surgical Consult Date: 06/23/25     Education  Person Taught: patient  Learning Readiness and Ability: no barriers identified  Pre-op Care Instructions: proper use of medications, when to take, and when to hold, pain management, physical activity restriction, line(s)/drain(s) management, incision care/wound management, diet (information send via [employer] following call.)  Pre-op Infection Prevention Reviewed: pre-op CHG bathing instructions, bathing care after procedure  Pre-op  Education/Instructions provided: how to prepare for surgery, what to expect on surgery day, surgery location specifics (map, parking, phone number), showering before surgery, eating before and after surgery, line/drain management  Post-op Care Instructions: when to call provider, pain management, diet, physical activity restriction, line(s)/drain(s) management, incision care/wound management  Education Outcome Evaluation: eager to learn, verbalizes understanding       Intervention/Education provided during outreach and Follow up plans:                                                       Follow up as scheduled for post operative visit.   Follow up with post discharge phone call, date pending discharge date.       Signature:  Rosmery Monroe RN

## 2025-06-25 ENCOUNTER — TELEPHONE (OUTPATIENT)
Dept: SURGERY | Facility: CLINIC | Age: 80
End: 2025-06-25
Payer: MEDICARE

## 2025-06-25 NOTE — TELEPHONE ENCOUNTER
Left voicemail for patient regarding scheduling surgery with Dr. Arroyo and Dr. Sánchez.    Left call back [phone].    Dawit Guerra on 6/25/2025 at 4:10 PM

## 2025-07-07 ENCOUNTER — PATIENT OUTREACH (OUTPATIENT)
Dept: CARE COORDINATION | Facility: CLINIC | Age: 80
End: 2025-07-07
Payer: MEDICARE

## 2025-07-07 NOTE — PROGRESS NOTES
Social Work - Telephone/MyChart message  [facility]  Data: malignant neoplasm of head of pancreas followed by Dr Arroyo  Patient Name: Jany Scherer  Goes By: Jany MOYER/Age: 1945 (79 year old)      Referral Source: Dr Arroyo/Rosmery Monroe RN    Reason for Referral: [facility] for upcoming appts / surgery    Intervention: Left voice message for patient on 2025.   Plan:  will await patient's return phone call/message and provide assistance at that time.      RUBI Cervantes, LGSW  Clinical , Adult Oncology  [facility]   [address] [place] [address]  [email or web address]  Office Phone: [phone]  Support Groups at [facility]: Social Work Services for Cancer Patients ([email or web address])

## 2025-07-09 ENCOUNTER — PATIENT OUTREACH (OUTPATIENT)
Dept: CARE COORDINATION | Facility: CLINIC | Age: 80
End: 2025-07-09
Payer: MEDICARE

## 2025-07-09 NOTE — TELEPHONE ENCOUNTER
FUTURE VISIT INFORMATION      SURGERY INFORMATION:  Date: 25  Location: [facility] OR  Surgeon:  Fernando Arroyo MD   Anesthesia Type:  General   Procedure: OPEN WHIPPLE PROCEDURE  Consult: 25    RECORDS REQUESTED FROM:       Primary Care Provider: Erica Begum MD -  [facility]    Pertinent Medical History: Anemia; hx blood transfusion; GERD; Hyperlipidemia; hypertension; hypokalemia; Sleep apnea; s/p colostomy; TOS; Thrombocytopenia; Leukocytosis; Dysmetabolic syndrome X    Most recent EKG+ Tracin25 - [facility]    Most recent ECHO: 24 - [facility]     Most recent PFT's: 16 - [facility]    Most recent Sleep Study: 14 - [facility]     Action 2025  4:56 PM MMT   Action Taken  Request faxed to [facility] for EKG tracings.

## 2025-07-09 NOTE — PROGRESS NOTES
Social Work - Intervention  [facility]  Data/Intervention: malignant neoplasm of head of pancreas    Patient Name: Jany Scherer Goes By: Jany       /Age: 1945 (79 year old)     Visit Type: telephone and MyChart  Referral Source: Dr. Arroyo  [facility]     Collaborated With:    -patient  -care team  -[facility]      Psychosocial Information/Concerns:  SW received return phone call from patient regarding [facility] accommodations and inquiry of how SW can support her. SW introduced self and role. Patient reports she is coming down to [facility] for some appointments  in addition to a surgery scheduled with Dr. Arroyo on .     Intervention/Education/Resources Provided:  SW provided education on resources/role  SW will complete HL referral for both upcoming dates  SW will remain available to assist as needs arise  SW assisted with Mychart      Assessment/Plan:     Provided patient/family with contact information and availability.    RUBI Cervantes, LGBRADY  Clinical , Adult Oncology  [facility]   [address] [place] [id number]  [email or web address]  Office Phone: [phone]  Support Groups at [facility]: Social Work Services for Cancer Patients ([email or web address])

## 2025-07-20 LAB
ABO + RH BLD: NORMAL
BLD GP AB SCN SERPL QL: NEGATIVE
SPECIMEN EXP DATE BLD: NORMAL

## 2025-07-21 ENCOUNTER — ANESTHESIA EVENT (OUTPATIENT)
Dept: SURGERY | Facility: CLINIC | Age: 80
End: 2025-07-21

## 2025-07-21 ENCOUNTER — PRE VISIT (OUTPATIENT)
Dept: SURGERY | Facility: CLINIC | Age: 80
End: 2025-07-21

## 2025-07-21 ENCOUNTER — OFFICE VISIT (OUTPATIENT)
Dept: SURGERY | Facility: CLINIC | Age: 80
End: 2025-07-21
Payer: MEDICARE

## 2025-07-21 ENCOUNTER — ANCILLARY PROCEDURE (OUTPATIENT)
Dept: CT IMAGING | Facility: CLINIC | Age: 80
End: 2025-07-21
Attending: SURGERY
Payer: MEDICARE

## 2025-07-21 ENCOUNTER — TELEPHONE (OUTPATIENT)
Dept: SURGERY | Facility: CLINIC | Age: 80
End: 2025-07-21

## 2025-07-21 ENCOUNTER — LAB (OUTPATIENT)
Dept: LAB | Facility: CLINIC | Age: 80
End: 2025-07-21
Attending: FAMILY MEDICINE
Payer: MEDICARE

## 2025-07-21 VITALS
OXYGEN SATURATION: 98 % | TEMPERATURE: 98 F | SYSTOLIC BLOOD PRESSURE: 108 MMHG | BODY MASS INDEX: 27.25 KG/M2 | HEART RATE: 85 BPM | HEIGHT: 64 IN | WEIGHT: 159.6 LBS | DIASTOLIC BLOOD PRESSURE: 70 MMHG | RESPIRATION RATE: 16 BRPM

## 2025-07-21 DIAGNOSIS — C25.0 MALIGNANT NEOPLASM OF HEAD OF PANCREAS (H): ICD-10-CM

## 2025-07-21 DIAGNOSIS — Z01.818 PREOP EXAMINATION: Primary | ICD-10-CM

## 2025-07-21 DIAGNOSIS — D64.9 ANEMIA, UNSPECIFIED TYPE: ICD-10-CM

## 2025-07-21 DIAGNOSIS — R06.09 DOE (DYSPNEA ON EXERTION): ICD-10-CM

## 2025-07-21 DIAGNOSIS — Z01.818 PREOP EXAMINATION: ICD-10-CM

## 2025-07-21 DIAGNOSIS — Z01.818 ENCOUNTER FOR PREOPERATIVE ANESTHESIOLOGY ASSESSMENT FOR CARDIAC SURGERY: ICD-10-CM

## 2025-07-21 LAB
ALBUMIN SERPL BCG-MCNC: 2.9 G/DL (ref 3.5–5.2)
ALP SERPL-CCNC: 201 U/L (ref 40–150)
ALT SERPL W P-5'-P-CCNC: 11 U/L (ref 0–50)
ANION GAP SERPL CALCULATED.3IONS-SCNC: 10 MMOL/L (ref 7–15)
AST SERPL W P-5'-P-CCNC: 28 U/L (ref 0–45)
BILIRUB SERPL-MCNC: 0.4 MG/DL
BUN SERPL-MCNC: 8.6 MG/DL (ref 8–23)
CALCIUM SERPL-MCNC: 9 MG/DL (ref 8.8–10.4)
CHLORIDE SERPL-SCNC: 101 MMOL/L (ref 98–107)
CREAT BLD-MCNC: 0.4 MG/DL (ref 0.5–1)
CREAT SERPL-MCNC: 0.4 MG/DL (ref 0.51–0.95)
EGFRCR SERPLBLD CKD-EPI 2021: >60 ML/MIN/1.73M2
EGFRCR SERPLBLD CKD-EPI 2021: >90 ML/MIN/1.73M2
ERYTHROCYTE [DISTWIDTH] IN BLOOD BY AUTOMATED COUNT: 18 % (ref 10–15)
FERRITIN SERPL-MCNC: 513 NG/ML (ref 11–328)
GLUCOSE SERPL-MCNC: 75 MG/DL (ref 70–99)
HCO3 SERPL-SCNC: 24 MMOL/L (ref 22–29)
HCT VFR BLD AUTO: 31.5 % (ref 35–47)
HGB BLD-MCNC: 10.1 G/DL (ref 11.7–15.7)
HOLD SPECIMEN: NORMAL
IRON BINDING CAPACITY (ROCHE): 242 UG/DL (ref 240–430)
IRON SATN MFR SERPL: 26 % (ref 15–46)
IRON SERPL-MCNC: 62 UG/DL (ref 37–145)
MCH RBC QN AUTO: 31 PG (ref 26.5–33)
MCHC RBC AUTO-ENTMCNC: 32.1 G/DL (ref 31.5–36.5)
MCV RBC AUTO: 97 FL (ref 78–100)
PLATELET # BLD AUTO: 292 10E3/UL (ref 150–450)
POTASSIUM SERPL-SCNC: 4.1 MMOL/L (ref 3.4–5.3)
PREALB SERPL-MCNC: 11.3 MG/DL (ref 20–40)
PROT SERPL-MCNC: 6.1 G/DL (ref 6.4–8.3)
RBC # BLD AUTO: 3.26 10E6/UL (ref 3.8–5.2)
SODIUM SERPL-SCNC: 135 MMOL/L (ref 135–145)
WBC # BLD AUTO: 3.6 10E3/UL (ref 4–11)

## 2025-07-21 PROCEDURE — 84134 ASSAY OF PREALBUMIN: CPT

## 2025-07-21 PROCEDURE — 82728 ASSAY OF FERRITIN: CPT

## 2025-07-21 PROCEDURE — 71260 CT THORAX DX C+: CPT | Mod: GC | Performed by: RADIOLOGY

## 2025-07-21 PROCEDURE — 74177 CT ABD & PELVIS W/CONTRAST: CPT | Mod: GC | Performed by: RADIOLOGY

## 2025-07-21 PROCEDURE — 82565 ASSAY OF CREATININE: CPT | Performed by: PATHOLOGY

## 2025-07-21 PROCEDURE — 36415 COLL VENOUS BLD VENIPUNCTURE: CPT

## 2025-07-21 PROCEDURE — 82565 ASSAY OF CREATININE: CPT

## 2025-07-21 PROCEDURE — 86900 BLOOD TYPING SEROLOGIC ABO: CPT

## 2025-07-21 PROCEDURE — 85027 COMPLETE CBC AUTOMATED: CPT

## 2025-07-21 PROCEDURE — 83550 IRON BINDING TEST: CPT

## 2025-07-21 PROCEDURE — 99215 OFFICE O/P EST HI 40 MIN: CPT | Performed by: PHYSICIAN ASSISTANT

## 2025-07-21 PROCEDURE — 86301 IMMUNOASSAY TUMOR CA 19-9: CPT

## 2025-07-21 PROCEDURE — 99417 PROLNG OP E/M EACH 15 MIN: CPT | Performed by: PHYSICIAN ASSISTANT

## 2025-07-21 RX ORDER — AZELASTINE HYDROCHLORIDE 137 UG/1
137 SPRAY, METERED NASAL PRN
COMMUNITY
Start: 2025-06-24

## 2025-07-21 RX ORDER — HEPARIN SODIUM (PORCINE) LOCK FLUSH IV SOLN 100 UNIT/ML 100 UNIT/ML
500 SOLUTION INTRAVENOUS ONCE
Status: COMPLETED | OUTPATIENT
Start: 2025-07-21 | End: 2025-07-21

## 2025-07-21 RX ORDER — IOPAMIDOL 755 MG/ML
89 INJECTION, SOLUTION INTRAVASCULAR ONCE
Status: COMPLETED | OUTPATIENT
Start: 2025-07-21 | End: 2025-07-21

## 2025-07-21 RX ADMIN — IOPAMIDOL 89 ML: 755 INJECTION, SOLUTION INTRAVASCULAR at 15:42

## 2025-07-21 RX ADMIN — HEPARIN SODIUM (PORCINE) LOCK FLUSH IV SOLN 100 UNIT/ML 500 UNITS: 100 SOLUTION at 15:47

## 2025-07-21 ASSESSMENT — ENCOUNTER SYMPTOMS: SEIZURES: 0

## 2025-07-21 ASSESSMENT — LIFESTYLE VARIABLES: TOBACCO_USE: 0

## 2025-07-21 ASSESSMENT — PAIN SCALES - GENERAL: PAINLEVEL_OUTOF10: NO PAIN (0)
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
## 2025-07-21 NOTE — TELEPHONE ENCOUNTER
Updated Jany of her upcoming stress test on 7/28, 9:30 am at [facility].  Also reviewed the prep, including to hold metoprolol the morning of the test; Jany expressed understanding.  Ronel Bolton RN

## 2025-07-21 NOTE — NURSING NOTE
Chief Complaint   Patient presents with    Blood Draw     Labs drawn from PIV placed by RN. Line flushed with saline. Vitals taken. Pt checked in for appointment(s).      Labs drawn from PIV placed by VAT. Line flushed with saline.     Alice Mejia RN

## 2025-07-21 NOTE — DISCHARGE INSTRUCTIONS

## 2025-07-21 NOTE — PATIENT INSTRUCTIONS
Preparing for Your Surgery      Name:  Jany Scherer   MRN:  [id number]   :  1945   Today's Date:  2025     The [place] [employer] [address] Construction Project                                Timeline 2025 -2025    This project will affect travel to the [facility] and [facility], as well as the [facility].      Please check the [facility] [address] project website for the most up to date information and give yourself additional time to reach your destination.        Arriving for surgery:  Surgery date:  2025  Arrival time:  6:00 am    Please come to:       [facility]    [address] [place]  [address]     The [facility] ([facility] [place] Patient/Visitor Ramp is at [address]. Patients and visitors who self-park will receive the reduced hospital parking rate. If the Patient /Visitor Ramp is full, please follow the signs to the [employer] car park located at the [facility] entrance.      [employer] parking is available (24 hours/ 7 days a week)      Discounted parking pass options are available for patients and visitors. They can be purchased at the [employer] desk at the [facility] entrance.     -    Stop at the security desk and they will direct surgery patients to the Surgery Check in and Family Lounge. [phone]        - If you need directions, a wheelchair or an escort please stop at the Information/security desk in the lobby.     What can I eat or drink?  -  You may eat and drink normally up to 8 hours prior to arrival time. (Until 10:00 pm on 2025)  -  You may have clear liquids until 2 hours prior to arrival time. (Until 4:00 am)    Examples of clear liquids:  Water  Clear broth  Juices (apple, white grape, white cranberry  and cider) without pulp  Noncarbonated, powder based beverages  (lemonade and  Hong-Aid)  Sodas (Sprite, 7-Up, ginger ale and seltzer)  Coffee or tea (without milk or cream)  Gatorade    -  No Alcohol or cannabis products for at least 24 hours before surgery.     Which medicines can I take?    Hold Aspirin for 7 days before surgery.   Hold Multivitamins for 7 days before surgery.  Hold Supplements for 7 days before surgery.  Hold Ibuprofen (Advil, Motrin) for 1 day(s) before surgery--unless otherwise directed by surgeon.  Hold Naproxen (Aleve) for 4 days before surgery.    Hold Eliquis for 2 days prior to surgery. Last dose is on 8/4/2025.     -  DO NOT take these medications the day of surgery:    Folic Acid (Folvite)  Magnesium Oxide   Polyethylene glycol (Miralax)  Potassium chloride (K-tab)   Furosemide (Lasix)       -  PLEASE TAKE these medications the day of surgery:    Acetaminophen (Tylenol)   Azelastine spray, as needed  Eye drops  Diltiazem   Flonase, nasal spray  Gabapentin (Neurontin)   Gemfibrozil (Lopid)   Metoprolol (Toprol)   Prochlorperazine (Compazine)   Sulfasalazine (Azulfidine)   Omeprazole (Prilosec), as needed        How do I prepare myself?  - Please take 2 showers (one the night prior to surgery and one the morning of surgery) using Scrubcare or Hibiclens soap.    Use this soap only from the neck to your toes. Avoid genital area      Leave the soap on your skin for one minute--then rinse thoroughly.      You may use your own shampoo and conditioner. No other hair products.   - Please remove all jewelry and body piercings.  - No lotions, deodorants or fragrance.  - No makeup or fingernail polish.   - Bring your ID and insurance card.    -For patients being admitted to the [facility]  Family members are to take the patient belongings with them and place them in the lockers provided in the Family Lounge.  Please limit the items you bring to 1 bag as the lockers are small.      -If you use a CPAP machine, please bring the CPAP machine, tubing, and mask to  hospital.    -If you have a Deep Brain Stimulator, Spinal Cord Stimulator, or any Neuro Stimulator device---you must bring the remote control to the hospital.      ALL PATIENTS GOING HOME THE SAME DAY OF SURGERY ARE REQUIRED TO HAVE A RESPONSIBLE ADULT TO DRIVE AND BE IN ATTENDANCE WITH THEM FOR 24 HOURS FOLLOWING SURGERY.    Covid testing policy as of 12/06/2022  Your surgeon will notify and schedule you for a COVID test if one is needed before surgery--please direct any questions or COVID symptoms to your surgeon      Questions or Concerns:    - For any questions regarding the day of surgery or your hospital stay, please contact the Pre Admission Nursing Office at [phone].       - If you have health changes between today and your surgery, please call your surgeon.       - For questions after surgery, please call your surgeons office.           Current Visitor Guidelines    2 adult visitors for adult patients in the pre op area    If additional visitors come (beyond a patient care attendant or a group home caregiver), the additional visitors will be asked to wait in the main lobby of the hospital    Visiting hours: 8 a.m. to 8:30 p.m.    Patients confirmed or suspected to have symptoms of COVID 19 or flu:     No visitors allowed for adult patients.   Children (under age 18) can have 1 named visitor.     People who are sick or showing symptoms of COVID 19 or flu:    Are not allowed to visit patients--we can only make exceptions in special situations.       Please follow these guidelines for your visit:          Please maintain social distance          Masking is optional--however at times you may be asked to wear a mask for the safety of yourself and others     Clean your hands with alcohol hand . Do this when you arrive at and leave the building and patient room,    And again after you touch your mask or anything in the room.     Go directly to and from the room you are visiting.     Stay in the  patient s room during your visit. Limit going to other places in the hospital as much as possible     Leave bags and jackets at home or in the car.     For everyone s health, please don t come and go during your visit. That includes for smoking   during your visit.

## 2025-07-22 LAB — CANCER AG19-9 SERPL IA-ACNC: 298 U/ML

## 2025-07-25 ENCOUNTER — VIRTUAL VISIT (OUTPATIENT)
Dept: SURGERY | Facility: CLINIC | Age: 80
End: 2025-07-25
Attending: SURGERY
Payer: MEDICARE

## 2025-07-25 VITALS — HEIGHT: 64 IN | BODY MASS INDEX: 27.14 KG/M2 | WEIGHT: 159 LBS

## 2025-07-25 DIAGNOSIS — C25.0 MALIGNANT NEOPLASM OF HEAD OF PANCREAS (H): Primary | ICD-10-CM

## 2025-07-25 ASSESSMENT — PAIN SCALES - GENERAL: PAINLEVEL_OUTOF10: NO PAIN (0)

## 2025-07-25 NOTE — NURSING NOTE
Current patient location: [address] [place], [place] [address]     [facility] [address]    Is the patient currently in the state of [place]? YES    Visit mode: TELEPHONE    If the visit is dropped, the patient can be reconnected by:TELEPHONE VISIT: Phone number:   Telephone Information:   Mobile [phone]       Will anyone else be joining the visit? NO  (If patient encounters technical issues they should call [phone]    Are changes needed to the allergy or medication list? No    Are refills needed on medications prescribed by this physician? NO    Rooming Documentation:  Questionnaire(s) completed    Reason for visit: JOVI MARIE

## 2025-07-25 NOTE — LETTER
7/25/2025      Jany Scherer  [address]  [address]  [facility] [address]      Dear Colleague,    Thank you for referring your patient, Jany Scherer, to the [facility]. Please see a copy of my visit note below.    Virtual Visit Details    Type of service:  Telephone Visit   Phone call duration: 15 minutes   Originating Location (pt. Location): Home    Distant Location (provider location):  On-site  Telephone visit completed due to the patient did not consent to a video visit.    I called the patient to discuss her frailty and my concern about her ability to recover well from a planned Whipple procedure.  She had a preoperative visit with our anesthesia team and they shared their concerns with me as well.  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  We have previously discussed this as well but we had a bit more of an in-depth conversation today.  Given that she already needs a bit of assistance with ambulation and she is limited in mobility I discussed my concern that she would require transitional care unit or nursing home placement after surgery.  We also had a bit more conversation about diet, bowel habits, generally long recovery, worsened quality of life after surgery or lessened independence.  I think the patient appreciated the call and seemed very thoughtful about her situation.  Her nutritional status is also quite poor with a pre-albumin of 11. She made it clear that she did not want to proceed with any surgery that may result in a diminished quality of life or reduce her independence.  I discussed with her that a Whipple procedure definitely can lead to both of those things and with that in mind I think we came to the agreement that we should probably not proceed with surgery and rather consider  palliative chemoradiation as consolidation therapy for her tumor.    I offered to the patient that I think would be good for her to speak with her family members over the weekend and I would plan to contact her next week for a final decision.  In the meantime she will continue to keep her cardiac evaluation scheduled on Monday.    15 minutes was spent on phone call today.  15 minutes was spent in multidisciplinary discussion coordination of care and documentation.    Again, thank you for allowing me to participate in the care of your patient.        Sincerely,        Fernando Arroyo MD    Electronically signed

## 2025-07-25 NOTE — PROGRESS NOTES
Virtual Visit Details    Type of service:  Telephone Visit   Phone call duration: 15 minutes   Originating Location (pt. Location): Home    Distant Location (provider location):  On-site  Telephone visit completed due to the patient did not consent to a video visit.    I called the patient to discuss her frailty and my concern about her ability to recover well from a planned Whipple procedure.  She had a preoperative visit with our anesthesia team and they shared their concerns with me as well.  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  We have previously discussed this as well but we had a bit more of an in-depth conversation today.  Given that she already needs a bit of assistance with ambulation and she is limited in mobility I discussed my concern that she would require transitional care unit or nursing home placement after surgery.  We also had a bit more conversation about diet, bowel habits, generally long recovery, worsened quality of life after surgery or lessened independence.  I think the patient appreciated the call and seemed very thoughtful about her situation.  Her nutritional status is also quite poor with a pre-albumin of 11. She made it clear that she did not want to proceed with any surgery that may result in a diminished quality of life or reduce her independence.  I discussed with her that a Whipple procedure definitely can lead to both of those things and with that in mind I think we came to the agreement that we should probably not proceed with surgery and rather consider palliative chemoradiation as consolidation therapy for her tumor.    I offered to the patient that I think would be good for her to speak with her family members over the weekend and I would plan to contact her next week for a final decision.  In the meantime  she will continue to keep her cardiac evaluation scheduled on Monday.    15 minutes was spent on phone call today.  15 minutes was spent in multidisciplinary discussion coordination of care and documentation.

## 2025-07-28 ENCOUNTER — HOSPITAL ENCOUNTER (OUTPATIENT)
Dept: CARDIOLOGY | Facility: CLINIC | Age: 80
Discharge: HOME | End: 2025-07-28
Attending: PHYSICIAN ASSISTANT
Payer: MEDICARE

## 2025-07-28 VITALS
HEIGHT: 64 IN | BODY MASS INDEX: 27.25 KG/M2 | WEIGHT: 159.6 LBS | DIASTOLIC BLOOD PRESSURE: 76 MMHG | SYSTOLIC BLOOD PRESSURE: 118 MMHG | OXYGEN SATURATION: 96 %

## 2025-07-28 VITALS — DIASTOLIC BLOOD PRESSURE: 62 MMHG | SYSTOLIC BLOOD PRESSURE: 114 MMHG | HEART RATE: 83 BPM

## 2025-07-28 DIAGNOSIS — R06.09 DOE (DYSPNEA ON EXERTION): ICD-10-CM

## 2025-07-28 DIAGNOSIS — D64.9 ANEMIA, UNSPECIFIED TYPE: ICD-10-CM

## 2025-07-28 DIAGNOSIS — Z01.818 PREOP EXAMINATION: ICD-10-CM

## 2025-07-28 DIAGNOSIS — C25.0 MALIGNANT NEOPLASM OF HEAD OF PANCREAS (H): ICD-10-CM

## 2025-07-28 DIAGNOSIS — Z01.818 ENCOUNTER FOR PREOPERATIVE ANESTHESIOLOGY ASSESSMENT FOR CARDIAC SURGERY: ICD-10-CM

## 2025-07-28 LAB
CV STRESS MAX HR HE: 97
NUC STRESS EJECTION FRACTION: 63 %
RATE PRESSURE PRODUCT: NORMAL
STRESS ECHO BASELINE DIASTOLIC HE: 60
STRESS ECHO BASELINE HR: 77 BPM
STRESS ECHO BASELINE SYSTOLIC BP: 114
STRESS ECHO CALCULATED PERCENT HR: 68 %
STRESS ECHO LAST STRESS DIASTOLIC BP: 60
STRESS ECHO LAST STRESS SYSTOLIC BP: 106
STRESS ECHO TARGET HR: 142
STRESS/REST PERFUSION RATIO: 1.27

## 2025-07-28 PROCEDURE — 78452 HT MUSCLE IMAGE SPECT MULT: CPT

## 2025-07-28 PROCEDURE — 93016 CV STRESS TEST SUPVJ ONLY: CPT | Performed by: INTERNAL MEDICINE

## 2025-07-28 PROCEDURE — 93018 CV STRESS TEST I&R ONLY: CPT | Performed by: INTERNAL MEDICINE

## 2025-07-28 PROCEDURE — 250N000011 HC RX IP 250 OP 636: Performed by: INTERNAL MEDICINE

## 2025-07-28 PROCEDURE — 343N000001 HC RX 343 MED OP 636: Performed by: PHYSICIAN ASSISTANT

## 2025-07-28 PROCEDURE — 78452 HT MUSCLE IMAGE SPECT MULT: CPT | Mod: 26 | Performed by: INTERNAL MEDICINE

## 2025-07-28 PROCEDURE — A9502 TC99M TETROFOSMIN: HCPCS | Performed by: PHYSICIAN ASSISTANT

## 2025-07-28 RX ORDER — AMINOPHYLLINE 25 MG/ML
50-100 INJECTION, SOLUTION INTRAVENOUS
Status: DISCONTINUED | OUTPATIENT
Start: 2025-07-28 | End: 2025-07-29 | Stop reason: HOSPADM

## 2025-07-28 RX ORDER — ALBUTEROL SULFATE 90 UG/1
2 INHALANT RESPIRATORY (INHALATION) EVERY 5 MIN PRN
Status: DISCONTINUED | OUTPATIENT
Start: 2025-07-28 | End: 2025-07-29 | Stop reason: HOSPADM

## 2025-07-28 RX ORDER — REGADENOSON 0.08 MG/ML
0.4 INJECTION, SOLUTION INTRAVENOUS ONCE
Status: COMPLETED | OUTPATIENT
Start: 2025-07-28 | End: 2025-07-28

## 2025-07-28 RX ORDER — CAFFEINE CITRATE 20 MG/ML
60 SOLUTION INTRAVENOUS
Status: DISCONTINUED | OUTPATIENT
Start: 2025-07-28 | End: 2025-07-28 | Stop reason: HOSPADM

## 2025-07-28 RX ORDER — CAFFEINE 200 MG
200 TABLET ORAL
Status: DISCONTINUED | OUTPATIENT
Start: 2025-07-28 | End: 2025-07-28 | Stop reason: HOSPADM

## 2025-07-28 RX ORDER — LIDOCAINE 40 MG/G
CREAM TOPICAL
Status: DISCONTINUED | OUTPATIENT
Start: 2025-07-28 | End: 2025-07-29 | Stop reason: HOSPADM

## 2025-07-28 RX ORDER — SODIUM CHLORIDE 9 MG/ML
INJECTION, SOLUTION INTRAVENOUS CONTINUOUS PRN
Status: DISCONTINUED | OUTPATIENT
Start: 2025-07-28 | End: 2025-07-28 | Stop reason: HOSPADM

## 2025-07-28 RX ORDER — NITROGLYCERIN 0.4 MG/1
0.4 TABLET SUBLINGUAL EVERY 5 MIN PRN
Status: DISCONTINUED | OUTPATIENT
Start: 2025-07-28 | End: 2025-07-28 | Stop reason: HOSPADM

## 2025-07-28 RX ADMIN — TETROFOSMIN 3.4 MILLICURIE: 1.38 INJECTION, POWDER, LYOPHILIZED, FOR SOLUTION INTRAVENOUS at 09:57

## 2025-07-28 RX ADMIN — TETROFOSMIN 8.88 MILLICURIE: 1.38 INJECTION, POWDER, LYOPHILIZED, FOR SOLUTION INTRAVENOUS at 11:38

## 2025-07-28 RX ADMIN — REGADENOSON 0.4 MG: 0.08 INJECTION, SOLUTION INTRAVENOUS at 11:34

## 2025-08-01 ENCOUNTER — PATIENT OUTREACH (OUTPATIENT)
Dept: ONCOLOGY | Facility: CLINIC | Age: 80
End: 2025-08-01
Payer: MEDICARE

## 2025-08-07 ENCOUNTER — ANESTHESIA (OUTPATIENT)
Dept: SURGERY | Facility: CLINIC | Age: 80
End: 2025-08-07

## (undated) DEVICE — KIT ENDO FIRST STEP DISINFECTANT 200ML W/POUCH EP-4

## (undated) DEVICE — DRSG GAUZE 4X4" TRAY

## (undated) DEVICE — ENDO PROBE COVER ULTRASOUND BALLOON LATEX  MAJ-249

## (undated) DEVICE — GLOVE PROTEXIS W/NEU-THERA 8.5  2D73TE85

## (undated) DEVICE — SOL NACL 0.9% IRRIG 1000ML BOTTLE 07138-09

## (undated) DEVICE — DRSG BANDAID 3/4X3"

## (undated) DEVICE — GLOVE PROTEXIS W/NEU-THERA 8.0  2D73TE80

## (undated) DEVICE — ENDO CAP AND TUBING STERILE FOR ENDOGATOR  100130

## (undated) DEVICE — GOWN XXLG REINFORCED 9071EL

## (undated) DEVICE — ENDO SNARE POLYPECTOMY OVAL 15MM LOOP SD-240U-15

## (undated) DEVICE — KWIRE

## (undated) DEVICE — GLOVE PROTEXIS MICRO 8.5  2D73PM85

## (undated) DEVICE — GLOVE PROTEXIS W/NEU-THERA 7.5  2D73TE75

## (undated) DEVICE — PACK SMALL SPINE RIDGES

## (undated) DEVICE — IOM FLAT FEE

## (undated) DEVICE — BLADE SAW RECIP STRK 70X6X0.025MM 0277-096-250S5

## (undated) DEVICE — TOURNIQUET SGL  BLADDER 30"X4" BLUE 5921030135

## (undated) DEVICE — DRAPE C-ARM 60X42" 1013

## (undated) DEVICE — BLADE SAW OSCILLATING STRYK MED 9.0X25X0.38MM 2296-003-111

## (undated) DEVICE — PACK ENDOSCOPY GI CUSTOM UMMC

## (undated) DEVICE — SU VICRYL 3-0 PS-1 18" UND J683

## (undated) DEVICE — Device

## (undated) DEVICE — NEEDLE BIOPSY ECHOTIP ACUCORE 22 GA ULTRASOUND G59746

## (undated) DEVICE — SOL WATER IRRIG 1000ML BOTTLE 2F7114

## (undated) DEVICE — SU VICRYL 0 CT-1 27" J340H

## (undated) DEVICE — IMM LIMB ELEVATOR DC40-0203

## (undated) DEVICE — DRAPE COVER C-ARM SEAMLESS SNAP-KAP 03-KP26 LATEX FREE

## (undated) DEVICE — ENDO DEVICE LOCKING AND BIOPSY CAP M00545261

## (undated) DEVICE — BNDG ELASTIC 4"X5YDS UNSTERILE 6611-40

## (undated) DEVICE — DRAPE IOBAN ISOLATION VERTICAL 6619

## (undated) DEVICE — SU VICRYL 2-0 CT-2 27" UND J269H

## (undated) DEVICE — ENDO TUBING CO2 SMARTCAP STERILE DISP 100145CO2EXT

## (undated) DEVICE — ENDO ENDO DISTAL MAJ-2315

## (undated) DEVICE — LINEN TOWEL PACK X5 5464

## (undated) DEVICE — DRSG ABDOMINAL 07 1/2X8" 7197D

## (undated) DEVICE — PREP DURAPREP 26ML APL 8630

## (undated) DEVICE — CAST PLASTER SPLINT 5X30" 7395

## (undated) DEVICE — 10G BONE ACCESS TOOLS/KIT

## (undated) DEVICE — BNDG ELASTIC 4" DBL LENGTH UNSTERILE 6611-14

## (undated) DEVICE — CATH TRAY FOLEY SURESTEP 16FR W/URINE MTR STATLK LF A303416A

## (undated) DEVICE — GLOVE BIOGEL PI SZ 8.0 40880

## (undated) DEVICE — ESU GROUND PAD ADULT W/CORD E7507

## (undated) DEVICE — SUCTION CANISTER MEDIVAC LINER 3000ML W/LID 65651-530

## (undated) DEVICE — PAD CHUX UNDERPAD 23X24" 7136

## (undated) DEVICE — GLOVE BIOGEL PI MICRO INDICATOR UNDERGLOVE SZ 8.0 48980

## (undated) DEVICE — DRAPE SHEET REV FOLD 3/4 9349

## (undated) DEVICE — KIT CONNECTOR FOR OLYMPUS ENDOSCOPES DEFENDO 100310

## (undated) DEVICE — DRILL BIT TORNIER 3MM LONG DWD060

## (undated) DEVICE — ARTHREX JUMPSTART DRESSING

## (undated) DEVICE — SOL NACL 0.9% IRRIG 1000ML BOTTLE 2F7124

## (undated) DEVICE — LINEN ORTHO ACL PACK 5447

## (undated) DEVICE — LINEN DRAPE 54X72" 5467

## (undated) DEVICE — BAG CLEAR TRASH 1.3M 39X33" P4040C

## (undated) DEVICE — SPONGE LAP 18X18" X8435

## (undated) DEVICE — DRAPE MAYO STAND 23X54 8337

## (undated) DEVICE — SYR 10ML LL W/O NDL

## (undated) DEVICE — DRSG GAUZE 4X4" 3033

## (undated) DEVICE — CAST PADDING 4" STERILE 9044S

## (undated) DEVICE — SUCTION MANIFOLD NEPTUNE 2 SYS 4 PORT 0702-020-000

## (undated) DEVICE — PACK EXTREMITY SOP15EXFSD

## (undated) DEVICE — STPL SKIN 35W 059037

## (undated) DEVICE — CAST PADDING 4" UNSTERILE 9044

## (undated) DEVICE — BLADE KNIFE SURG 11 371111

## (undated) DEVICE — BIOPSY VALVE BIOSHIELD 00711135

## (undated) DEVICE — CATH RETRIEVAL BALLOON EXTRACTOR PRO RX-S INJ ABOVE 9-12MM

## (undated) DEVICE — SU ETHILON 3-0 FS-1 18" 669H

## (undated) DEVICE — WIRE GUIDE 0.025"X270CM ANG VISIGLIDE G-240-2527A

## (undated) DEVICE — CUSHION INSERT LG PRONE VIEW JACKSON TABLE

## (undated) DEVICE — BITE BLOCK ENDO W/DENTAL RIM 54FR SBT-114-100

## (undated) DEVICE — DRSG STERI STRIP 1/2X4" R1547

## (undated) RX ORDER — HEPARIN SODIUM (PORCINE) LOCK FLUSH IV SOLN 100 UNIT/ML 100 UNIT/ML
SOLUTION INTRAVENOUS
Status: DISPENSED
Start: 2025-07-21

## (undated) RX ORDER — TRANEXAMIC ACID 10 MG/ML
INJECTION, SOLUTION INTRAVENOUS
Status: DISPENSED
Start: 2023-02-03

## (undated) RX ORDER — FENTANYL CITRATE 50 UG/ML
INJECTION, SOLUTION INTRAMUSCULAR; INTRAVENOUS
Status: DISPENSED
Start: 2021-06-14

## (undated) RX ORDER — FENTANYL CITRATE 50 UG/ML
INJECTION, SOLUTION INTRAMUSCULAR; INTRAVENOUS
Status: DISPENSED
Start: 2023-02-03

## (undated) RX ORDER — PROPOFOL 10 MG/ML
INJECTION, EMULSION INTRAVENOUS
Status: DISPENSED
Start: 2023-02-03

## (undated) RX ORDER — CEFAZOLIN SODIUM/WATER 2 G/20 ML
SYRINGE (ML) INTRAVENOUS
Status: DISPENSED
Start: 2023-02-03

## (undated) RX ORDER — GABAPENTIN 100 MG/1
CAPSULE ORAL
Status: DISPENSED
Start: 2023-02-03

## (undated) RX ORDER — FENTANYL CITRATE 50 UG/ML
INJECTION, SOLUTION INTRAMUSCULAR; INTRAVENOUS
Status: DISPENSED
Start: 2017-05-22

## (undated) RX ORDER — BUPIVACAINE HYDROCHLORIDE 2.5 MG/ML
INJECTION, SOLUTION EPIDURAL; INFILTRATION; INTRACAUDAL
Status: DISPENSED
Start: 2023-02-03

## (undated) RX ORDER — HYDROMORPHONE HCL IN WATER/PF 6 MG/30 ML
PATIENT CONTROLLED ANALGESIA SYRINGE INTRAVENOUS
Status: DISPENSED
Start: 2023-02-03

## (undated) RX ORDER — REGADENOSON 0.08 MG/ML
INJECTION, SOLUTION INTRAVENOUS
Status: DISPENSED
Start: 2025-07-28

## (undated) RX ORDER — CLINDAMYCIN PHOSPHATE 900 MG/50ML
INJECTION, SOLUTION INTRAVENOUS
Status: DISPENSED
Start: 2021-06-14

## (undated) RX ORDER — KETOROLAC TROMETHAMINE 30 MG/ML
INJECTION, SOLUTION INTRAMUSCULAR; INTRAVENOUS
Status: DISPENSED
Start: 2023-02-03

## (undated) RX ORDER — PROPOFOL 10 MG/ML
INJECTION, EMULSION INTRAVENOUS
Status: DISPENSED
Start: 2017-05-22

## (undated) RX ORDER — FENTANYL CITRATE 50 UG/ML
INJECTION, SOLUTION INTRAMUSCULAR; INTRAVENOUS
Status: DISPENSED
Start: 2025-01-16

## (undated) RX ORDER — HYDROCODONE BITARTRATE AND ACETAMINOPHEN 5; 325 MG/1; MG/1
TABLET ORAL
Status: DISPENSED
Start: 2021-06-14

## (undated) RX ORDER — PROPOFOL 10 MG/ML
INJECTION, EMULSION INTRAVENOUS
Status: DISPENSED
Start: 2021-06-14

## (undated) RX ORDER — CITRIC ACID/SODIUM CITRATE 334-500MG
SOLUTION, ORAL ORAL
Status: DISPENSED
Start: 2025-01-16

## (undated) RX ORDER — LABETALOL HYDROCHLORIDE 5 MG/ML
INJECTION, SOLUTION INTRAVENOUS
Status: DISPENSED
Start: 2023-02-03

## (undated) RX ORDER — CEFAZOLIN SODIUM 2 G/100ML
INJECTION, SOLUTION INTRAVENOUS
Status: DISPENSED
Start: 2017-05-22